# Patient Record
Sex: MALE | Race: WHITE | NOT HISPANIC OR LATINO | Employment: FULL TIME | URBAN - METROPOLITAN AREA
[De-identification: names, ages, dates, MRNs, and addresses within clinical notes are randomized per-mention and may not be internally consistent; named-entity substitution may affect disease eponyms.]

---

## 2023-06-14 ENCOUNTER — HOSPITAL ENCOUNTER (OUTPATIENT)
Dept: DATA CONVERSION | Facility: HOSPITAL | Age: 48
End: 2023-06-14
Attending: INTERNAL MEDICINE | Admitting: INTERNAL MEDICINE
Payer: COMMERCIAL

## 2023-06-14 DIAGNOSIS — Z72.0 TOBACCO USE: ICD-10-CM

## 2023-06-14 DIAGNOSIS — K64.0 FIRST DEGREE HEMORRHOIDS: ICD-10-CM

## 2023-06-14 DIAGNOSIS — Z12.11 ENCOUNTER FOR SCREENING FOR MALIGNANT NEOPLASM OF COLON: ICD-10-CM

## 2023-09-07 VITALS
WEIGHT: 226.41 LBS | HEIGHT: 73 IN | TEMPERATURE: 97.7 F | HEART RATE: 88 BPM | BODY MASS INDEX: 30.01 KG/M2 | SYSTOLIC BLOOD PRESSURE: 144 MMHG | DIASTOLIC BLOOD PRESSURE: 94 MMHG | RESPIRATION RATE: 16 BRPM

## 2023-09-30 NOTE — H&P
History of Present Illness:   History Present Illness:  Reason for surgery: Colon cancer screening   HPI:    48 year-old male who presented for average-risk screening colonoscopy.  Patient denies any GI symptoms.    Allergies:        Allergies:  ·  No Known Allergies :     Home Medication Review:   Home Medications Reviewed: yes     Impression/Procedure:   ·  Impression and Planned Procedure: 48 year-old male who presented for average-risk screening colonoscopy.  Patient denies any GI symptoms.       ERAS (Enhanced Recovery After Surgery):  ·  ERAS Patient: no     Review of Systems:   Review of Systems:  Gastrointestinal: NEGATIVE: Nausea, Vomiting, Diarrhea,  Constipation, Abdominal Pain         Vital Signs:  Temperature C: 36.5 degrees C   Temperature F: 97.7 degrees F   Heart Rate: 88 beats per minute   Respiratory Rate: 16 breath per minute   Blood Pressure Systolic: 144 mm/Hg   Blood Pressure Diastolic: 94 mm/Hg     Physical Exam by System:    Constitutional: Awake/alert/oriented x3, no distress,  alert and cooperative   Eyes: EOMI, clear sclera   ENMT: mucous membranes moist, no apparent injury   Head/Neck: Neck supple, no apparent injury   Respiratory/Thorax: CTAB, normal breath sounds   Cardiovascular: Regular, rate and rhythm   Gastrointestinal: Nondistended, soft, non-tender,  no rebound tenderness or guarding   Musculoskeletal: ROM intact   Extremities: normal extremities, no cyanosis   Neurological: alert and oriented x3, intact senses   Psychological: Appropriate mood and behavior   Skin: Warm and dry, no rash     Consent:   COVID-19 Consent:  ·  COVID-19 Risk Consent Surgeon has reviewed key risks related to the risk of sanjay COVID-19 and if they contract COVID-19 what the risks are.       Electronic Signatures:  Irwin Lo)  (Signed 14-Jun-2023 08:41)   Authored: History of Present Illness, Allergies, Home  Medication Review, Impression/Procedure, ERAS, Review of Systems, Physical Exam,  Consent, Note Completion      Last Updated: 14-Jun-2023 08:41 by Irwin Lo)

## 2023-10-08 ENCOUNTER — LAB REQUISITION (OUTPATIENT)
Dept: LAB | Facility: HOSPITAL | Age: 48
End: 2023-10-08
Payer: COMMERCIAL

## 2023-10-08 DIAGNOSIS — U07.1 COVID-19: ICD-10-CM

## 2023-10-08 LAB — SARS-COV-2 RNA RESP QL NAA+PROBE: DETECTED

## 2023-10-08 PROCEDURE — 87635 SARS-COV-2 COVID-19 AMP PRB: CPT

## 2024-01-02 ENCOUNTER — OFFICE VISIT (OUTPATIENT)
Dept: PRIMARY CARE | Facility: CLINIC | Age: 49
End: 2024-01-02
Payer: COMMERCIAL

## 2024-01-02 ENCOUNTER — LAB (OUTPATIENT)
Dept: LAB | Facility: LAB | Age: 49
End: 2024-01-02
Payer: COMMERCIAL

## 2024-01-02 VITALS
WEIGHT: 237 LBS | HEART RATE: 73 BPM | HEIGHT: 73 IN | OXYGEN SATURATION: 97 % | SYSTOLIC BLOOD PRESSURE: 133 MMHG | BODY MASS INDEX: 31.41 KG/M2 | TEMPERATURE: 97.6 F | DIASTOLIC BLOOD PRESSURE: 93 MMHG

## 2024-01-02 DIAGNOSIS — K80.50 BILIARY COLIC: Primary | ICD-10-CM

## 2024-01-02 DIAGNOSIS — K80.50 BILIARY COLIC: ICD-10-CM

## 2024-01-02 LAB
ALBUMIN SERPL BCP-MCNC: 4.2 G/DL (ref 3.4–5)
ALP SERPL-CCNC: 63 U/L (ref 33–120)
ALT SERPL W P-5'-P-CCNC: 26 U/L (ref 10–52)
ANION GAP SERPL CALC-SCNC: 13 MMOL/L (ref 10–20)
APPEARANCE UR: CLEAR
AST SERPL W P-5'-P-CCNC: 18 U/L (ref 9–39)
BASOPHILS # BLD AUTO: 0.05 X10*3/UL (ref 0–0.1)
BASOPHILS NFR BLD AUTO: 0.6 %
BILIRUB SERPL-MCNC: 0.8 MG/DL (ref 0–1.2)
BILIRUB UR STRIP.AUTO-MCNC: NEGATIVE MG/DL
BUN SERPL-MCNC: 14 MG/DL (ref 6–23)
CALCIUM SERPL-MCNC: 9.1 MG/DL (ref 8.6–10.6)
CHLORIDE SERPL-SCNC: 102 MMOL/L (ref 98–107)
CO2 SERPL-SCNC: 29 MMOL/L (ref 21–32)
COLOR UR: YELLOW
CREAT SERPL-MCNC: 0.9 MG/DL (ref 0.5–1.3)
EOSINOPHIL # BLD AUTO: 0.35 X10*3/UL (ref 0–0.7)
EOSINOPHIL NFR BLD AUTO: 4.2 %
ERYTHROCYTE [DISTWIDTH] IN BLOOD BY AUTOMATED COUNT: 12.9 % (ref 11.5–14.5)
GFR SERPL CREATININE-BSD FRML MDRD: >90 ML/MIN/1.73M*2
GLUCOSE SERPL-MCNC: 100 MG/DL (ref 74–99)
GLUCOSE UR STRIP.AUTO-MCNC: NEGATIVE MG/DL
HCT VFR BLD AUTO: 50.6 % (ref 41–52)
HGB BLD-MCNC: 16.8 G/DL (ref 13.5–17.5)
IMM GRANULOCYTES # BLD AUTO: 0.01 X10*3/UL (ref 0–0.7)
IMM GRANULOCYTES NFR BLD AUTO: 0.1 % (ref 0–0.9)
KETONES UR STRIP.AUTO-MCNC: NEGATIVE MG/DL
LEUKOCYTE ESTERASE UR QL STRIP.AUTO: NEGATIVE
LIPASE SERPL-CCNC: 15 U/L (ref 9–82)
LYMPHOCYTES # BLD AUTO: 2.69 X10*3/UL (ref 1.2–4.8)
LYMPHOCYTES NFR BLD AUTO: 32.4 %
MCH RBC QN AUTO: 30.3 PG (ref 26–34)
MCHC RBC AUTO-ENTMCNC: 33.2 G/DL (ref 32–36)
MCV RBC AUTO: 91 FL (ref 80–100)
MONOCYTES # BLD AUTO: 1.14 X10*3/UL (ref 0.1–1)
MONOCYTES NFR BLD AUTO: 13.7 %
NEUTROPHILS # BLD AUTO: 4.07 X10*3/UL (ref 1.2–7.7)
NEUTROPHILS NFR BLD AUTO: 49 %
NITRITE UR QL STRIP.AUTO: NEGATIVE
NRBC BLD-RTO: 0 /100 WBCS (ref 0–0)
PH UR STRIP.AUTO: 7 [PH]
PLATELET # BLD AUTO: 196 X10*3/UL (ref 150–450)
POTASSIUM SERPL-SCNC: 4.1 MMOL/L (ref 3.5–5.3)
PROT SERPL-MCNC: 6.5 G/DL (ref 6.4–8.2)
PROT UR STRIP.AUTO-MCNC: NEGATIVE MG/DL
RBC # BLD AUTO: 5.55 X10*6/UL (ref 4.5–5.9)
RBC # UR STRIP.AUTO: NEGATIVE /UL
SODIUM SERPL-SCNC: 140 MMOL/L (ref 136–145)
SP GR UR STRIP.AUTO: 1.02
UROBILINOGEN UR STRIP.AUTO-MCNC: 2 MG/DL
WBC # BLD AUTO: 8.3 X10*3/UL (ref 4.4–11.3)

## 2024-01-02 PROCEDURE — 81003 URINALYSIS AUTO W/O SCOPE: CPT

## 2024-01-02 PROCEDURE — 83690 ASSAY OF LIPASE: CPT

## 2024-01-02 PROCEDURE — 85025 COMPLETE CBC W/AUTO DIFF WBC: CPT

## 2024-01-02 PROCEDURE — 36415 COLL VENOUS BLD VENIPUNCTURE: CPT

## 2024-01-02 PROCEDURE — 99214 OFFICE O/P EST MOD 30 MIN: CPT | Performed by: FAMILY MEDICINE

## 2024-01-02 PROCEDURE — 80053 COMPREHEN METABOLIC PANEL: CPT

## 2024-01-02 ASSESSMENT — LIFESTYLE VARIABLES
HOW MANY STANDARD DRINKS CONTAINING ALCOHOL DO YOU HAVE ON A TYPICAL DAY: 1 OR 2
AUDIT-C TOTAL SCORE: 3
HOW OFTEN DO YOU HAVE SIX OR MORE DRINKS ON ONE OCCASION: NEVER
SKIP TO QUESTIONS 9-10: 1
HOW OFTEN DO YOU HAVE A DRINK CONTAINING ALCOHOL: 2-3 TIMES A WEEK

## 2024-01-02 ASSESSMENT — COLUMBIA-SUICIDE SEVERITY RATING SCALE - C-SSRS
2. HAVE YOU ACTUALLY HAD ANY THOUGHTS OF KILLING YOURSELF?: NO
6. HAVE YOU EVER DONE ANYTHING, STARTED TO DO ANYTHING, OR PREPARED TO DO ANYTHING TO END YOUR LIFE?: NO
1. IN THE PAST MONTH, HAVE YOU WISHED YOU WERE DEAD OR WISHED YOU COULD GO TO SLEEP AND NOT WAKE UP?: NO

## 2024-01-02 ASSESSMENT — PATIENT HEALTH QUESTIONNAIRE - PHQ9
1. LITTLE INTEREST OR PLEASURE IN DOING THINGS: NOT AT ALL
SUM OF ALL RESPONSES TO PHQ9 QUESTIONS 1 & 2: 0
2. FEELING DOWN, DEPRESSED OR HOPELESS: NOT AT ALL

## 2024-01-02 NOTE — PROGRESS NOTES
"Complains of  RUQ  abdominal pain nonradiating for  60days worse after fatty meals lasting 20min  not improved with prilosec    Denies fever sore throat  nausea vomiting diarrhea melena hematochezia hematemesis dysuria hematuria polyuria flank pain hesitancy dribbling passing of urine or sediment nocturia cough chest congestion shortness of breath wheeze  sputum weight loss lightheadedness dizziness presyncope syncope    PMH: none    PSH: none    last colonoscopy:  2023      FH :  neg   for colon ca          neg   for inflammatory bowel disease          neg    for prostate ca                          etoh use :  < 1 per day         caffeine use: 2 per day          nsaid use : none     BP (!) 133/93   Pulse 73   Temp 36.4 °C (97.6 °F)   Ht 1.854 m (6' 1\")   Wt 108 kg (237 lb)   SpO2 97%   BMI 31.27 kg/m²       Not ill-appearing  Skin without pallor petechia icterus or ecchymosis  Membranes moist oropharynx normal  Neck without adenopathy  Chest clear to auscultation without rales rhonchi or wheeze  Heart regular rate and rhythm without murmur  Abdomen not rigid, nondistended nontender without  organomegaly  withno mass or bruits  no CVA tenderness   testes descended nontender no mass no hernias  Extremities warm peripheral pulses palpable    "

## 2024-01-02 NOTE — RESULT ENCOUNTER NOTE
Labs are normal showing however possibility of gallstones still exist.  Obtain ultrasound as discussed

## 2024-01-04 ENCOUNTER — APPOINTMENT (OUTPATIENT)
Dept: RADIOLOGY | Facility: CLINIC | Age: 49
End: 2024-01-04
Payer: COMMERCIAL

## 2024-01-04 ENCOUNTER — TELEPHONE (OUTPATIENT)
Dept: PRIMARY CARE | Facility: CLINIC | Age: 49
End: 2024-01-04
Payer: COMMERCIAL

## 2024-01-04 ENCOUNTER — ANCILLARY PROCEDURE (OUTPATIENT)
Dept: RADIOLOGY | Facility: CLINIC | Age: 49
End: 2024-01-04
Payer: COMMERCIAL

## 2024-01-04 DIAGNOSIS — K80.50 BILIARY COLIC: ICD-10-CM

## 2024-01-04 PROCEDURE — 76705 ECHO EXAM OF ABDOMEN: CPT

## 2024-01-04 PROCEDURE — 76705 ECHO EXAM OF ABDOMEN: CPT | Performed by: STUDENT IN AN ORGANIZED HEALTH CARE EDUCATION/TRAINING PROGRAM

## 2024-01-04 NOTE — TELEPHONE ENCOUNTER
Patient notified     ----- Message from Chris Neil MD sent at 1/2/2024  4:31 PM EST -----  Labs are normal showing however possibility of gallstones still exist.  Obtain ultrasound as discussed

## 2024-01-05 DIAGNOSIS — K82.4 GALLBLADDER POLYP: Primary | ICD-10-CM

## 2024-01-05 DIAGNOSIS — K80.50 BILIARY COLIC: ICD-10-CM

## 2024-01-09 PROBLEM — K82.4 GALLBLADDER POLYP: Status: ACTIVE | Noted: 2024-01-09

## 2024-01-09 NOTE — PROGRESS NOTES
History Of Present Illness  HPI   The patient is a 48-year-old male who complains of a 1 year history of mild intermittent right upper quadrant abdominal pain.  The pain radiates into the right lower quadrant and into his right chest and back.  The symptoms had improved, but over the past couple months the discomfort occurs daily.  Symptoms worsen after a fatty meal and typically will last about 30 minutes.  No nausea or vomiting.  No jaundice.  He has had purposeful weight loss of 5 pounds over the past 3 months.    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    Past Medical History  He has no past medical history on file.    Surgical History  He has a past surgical history that includes Other surgical history (01/18/2022) and Colonoscopy (2023).     Allergies  Patient has no known allergies.    Social History  He reports that he has been smoking cigars. He has never used smokeless tobacco. No history on file for alcohol use and drug use.    Family History  No family history on file.    Review of Systems  Review of Systems:  Constitutional:  no fever, no chills, no significant weight change  Neurological: No history of CVA or seizure disorder  Eyes: No pain, no recent visual change  ENT:  No recent hearing loss  Neck: No pain  Cardiovascular: No chest pain, no history of cardiac disease such as myocardial infarction or arrhythmia or congestive heart failure  Pulmonary: No shortness of breath, no history of pulmonary disease such as pneumonia or COPD  Breast: No history of breast disease or breast mass  Gastrointestinal:   As above.  No nausea or vomiting, no constipation or diarrhea or blood in the stool.  No history of ulcers.  No liver or pancreas disease.  No intestinal disorder.  Genitourinary: No hematuria or dysuria, no kidney disease  Musculoskeletal:  no arthralgia, no muscle or bone pain  Integumentary:  no rash  Psychiatric:   "No anxiety or depression  Endocrine:  no history of diabetes  Hematologic/Lymphatic: No easy bruising or bleeding          Last Recorded Vitals  Blood pressure (!) 127/91, pulse 98, temperature 36.4 °C (97.5 °F), temperature source Temporal, resp. rate 16, height 1.854 m (6' 1\"), weight 107 kg (235 lb 3.2 oz), SpO2 98 %.    Physical Exam  Constitutional: Well-developed, well-nourished, alert and oriented, no acute distress  Skin: Warm and dry, no lesions, no rashes, no jaundice  HEENT: Normocephalic, atraumatic, EOMI, no scleral icterus, eyes have no redness or swelling or discharge, external inspection of ears and nose is normal, mucous membranes moist  Neck: Soft, nontender, no mass or adenopathy  Cardiac: Regular rate and rhythm, no murmur  Chest: Patent airway, clear to auscultation, normal breath sounds with good chest expansion, no wheezes or rales or rhonchi noted, thorax symmetric  Abdomen: Nondistended, positive bowel sounds, soft, nontender, no mass  Rectal: Not performed  Extremities: No injury, no lower extremity edema or calf tenderness  Lymphatic: No cervical adenopathy  Musculoskeletal: Range of motion intact, no joint swelling, normal strength  Neurological: Alert and oriented x3, intact sensory and motor function, no obvious focal neurologic abnormalities, normal gait  Psychological: Appropriate mood and behavior    Relevant Results  Labs from January 2, 2024:  WBC 8.3, hemoglobin 16.8, platelet 196.  CMP unremarkable.  Lipase normal.     I reviewed the ultrasound report and images from January 4, 2024  IMPRESSION:  Mildly coarsened heterogenous hepatic echotexture against a  background of steatosis. Findings likely secondary to chronic liver  disease. No focal lesion.  8 mm echogenic gallbladder lesion, likely a polyp. Recommend 6-12  month ultrasound follow-up.    Assessment/Plan   Diagnoses and all orders for this visit:  Gallbladder polyp  -     CT abdomen pelvis w IV contrast; Future  Right " upper quadrant abdominal pain  -     CT abdomen pelvis w IV contrast; Future      48-year-old male with right upper quadrant abdominal pain, found to have an 8 mm gallbladder lesion, likely a polyp, on ultrasound.  A polyp would not explain his symptoms.  Recommend CT scan of the abdomen and pelvis for further evaluation.  Follow-up after the CT scan is completed.  Avoid fatty foods.  Recommend repeat gallbladder ultrasound in 6 months with follow-up.           Doug Arellano MD

## 2024-01-10 ENCOUNTER — OFFICE VISIT (OUTPATIENT)
Dept: SURGERY | Facility: CLINIC | Age: 49
End: 2024-01-10
Payer: COMMERCIAL

## 2024-01-10 VITALS
OXYGEN SATURATION: 98 % | BODY MASS INDEX: 31.17 KG/M2 | DIASTOLIC BLOOD PRESSURE: 91 MMHG | HEIGHT: 73 IN | WEIGHT: 235.2 LBS | RESPIRATION RATE: 16 BRPM | HEART RATE: 98 BPM | SYSTOLIC BLOOD PRESSURE: 127 MMHG | TEMPERATURE: 97.5 F

## 2024-01-10 DIAGNOSIS — K82.4 GALLBLADDER POLYP: Primary | ICD-10-CM

## 2024-01-10 DIAGNOSIS — R10.11 RIGHT UPPER QUADRANT ABDOMINAL PAIN: ICD-10-CM

## 2024-01-10 PROCEDURE — 99204 OFFICE O/P NEW MOD 45 MIN: CPT | Performed by: SURGERY

## 2024-01-10 NOTE — LETTER
January 10, 2024     Chris Neil MD  [address]  [address]  [facility] [address]    Patient: Alfredo Stevens   YOB: 1975   Date of Visit: 1/10/2024       Dear Dr. Chris Neil MD:    Thank you for referring Alfredo Stevens to me for evaluation. Below are my notes for this consultation.  If you have questions, please do not hesitate to call me. I look forward to following your patient along with you.       Sincerely,     Doug Arellano MD      CC: No Recipients  ______________________________________________________________________________________    History Of Present Illness  HPI   The patient is a 48-year-old male who complains of a 1 year history of mild intermittent right upper quadrant abdominal pain.  The pain radiates into the right lower quadrant and into his right chest and back.  The symptoms had improved, but over the past couple months the discomfort occurs daily.  Symptoms worsen after a fatty meal and typically will last about 30 minutes.  No nausea or vomiting.  No jaundice.  He has had purposeful weight loss of 5 pounds over the past 3 months.    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    Past Medical History  He has no past medical history on file.    Surgical History  He has a past surgical history that includes Other surgical history (01/18/2022) and Colonoscopy (2023).     Allergies  Patient has no known allergies.    Social History  He reports that he has been smoking cigars. He has never used smokeless tobacco. No history on file for alcohol use and drug use.    Family History  No family history on file.    Review of Systems  Review of Systems:  Constitutional:  no fever, no chills, no significant weight change  Neurological: No history of CVA or seizure disorder  Eyes: No pain, no recent visual change  ENT:  No recent hearing loss  Neck: No pain  Cardiovascular: No chest pain,  "no history of cardiac disease such as myocardial infarction or arrhythmia or congestive heart failure  Pulmonary: No shortness of breath, no history of pulmonary disease such as pneumonia or COPD  Breast: No history of breast disease or breast mass  Gastrointestinal:   As above.  No nausea or vomiting, no constipation or diarrhea or blood in the stool.  No history of ulcers.  No liver or pancreas disease.  No intestinal disorder.  Genitourinary: No hematuria or dysuria, no kidney disease  Musculoskeletal:  no arthralgia, no muscle or bone pain  Integumentary:  no rash  Psychiatric:  No anxiety or depression  Endocrine:  no history of diabetes  Hematologic/Lymphatic: No easy bruising or bleeding          Last Recorded Vitals  Blood pressure (!) 127/91, pulse 98, temperature 36.4 °C (97.5 °F), temperature source Temporal, resp. rate 16, height 1.854 m (6' 1\"), weight 107 kg (235 lb 3.2 oz), SpO2 98 %.    Physical Exam  Constitutional: Well-developed, well-nourished, alert and oriented, no acute distress  Skin: Warm and dry, no lesions, no rashes, no jaundice  HEENT: Normocephalic, atraumatic, EOMI, no scleral icterus, eyes have no redness or swelling or discharge, external inspection of ears and nose is normal, mucous membranes moist  Neck: Soft, nontender, no mass or adenopathy  Cardiac: Regular rate and rhythm, no murmur  Chest: Patent airway, clear to auscultation, normal breath sounds with good chest expansion, no wheezes or rales or rhonchi noted, thorax symmetric  Abdomen: Nondistended, positive bowel sounds, soft, nontender, no mass  Rectal: Not performed  Extremities: No injury, no lower extremity edema or calf tenderness  Lymphatic: No cervical adenopathy  Musculoskeletal: Range of motion intact, no joint swelling, normal strength  Neurological: Alert and oriented x3, intact sensory and motor function, no obvious focal neurologic abnormalities, normal gait  Psychological: Appropriate mood and " behavior    Relevant Results  Labs from January 2, 2024:  WBC 8.3, hemoglobin 16.8, platelet 196.  CMP unremarkable.  Lipase normal.     I reviewed the ultrasound report and images from January 4, 2024  IMPRESSION:  Mildly coarsened heterogenous hepatic echotexture against a  background of steatosis. Findings likely secondary to chronic liver  disease. No focal lesion.  8 mm echogenic gallbladder lesion, likely a polyp. Recommend 6-12  month ultrasound follow-up.    Assessment/Plan  Diagnoses and all orders for this visit:  Gallbladder polyp  -     CT abdomen pelvis w IV contrast; Future  Right upper quadrant abdominal pain  -     CT abdomen pelvis w IV contrast; Future      48-year-old male with right upper quadrant abdominal pain, found to have an 8 mm gallbladder lesion, likely a polyp, on ultrasound.  A polyp would not explain his symptoms.  Recommend CT scan of the abdomen and pelvis for further evaluation.  Follow-up after the CT scan is completed.  Avoid fatty foods.  Recommend repeat gallbladder ultrasound in 6 months with follow-up.           Doug Arellano MD

## 2024-01-30 ENCOUNTER — HOSPITAL ENCOUNTER (OUTPATIENT)
Dept: RADIOLOGY | Facility: CLINIC | Age: 49
Discharge: HOME | End: 2024-01-30
Payer: COMMERCIAL

## 2024-01-30 DIAGNOSIS — R10.11 RIGHT UPPER QUADRANT ABDOMINAL PAIN: ICD-10-CM

## 2024-01-30 DIAGNOSIS — K82.4 GALLBLADDER POLYP: ICD-10-CM

## 2024-01-30 PROCEDURE — 74177 CT ABD & PELVIS W/CONTRAST: CPT | Performed by: RADIOLOGY

## 2024-01-30 PROCEDURE — 2550000001 HC RX 255 CONTRASTS: Performed by: SURGERY

## 2024-01-30 PROCEDURE — 74177 CT ABD & PELVIS W/CONTRAST: CPT

## 2024-01-30 RX ADMIN — IOHEXOL 75 ML: 350 INJECTION, SOLUTION INTRAVENOUS at 11:39

## 2024-04-01 ENCOUNTER — OFFICE VISIT (OUTPATIENT)
Dept: PRIMARY CARE | Facility: CLINIC | Age: 49
End: 2024-04-01
Payer: COMMERCIAL

## 2024-04-01 ENCOUNTER — LAB (OUTPATIENT)
Dept: LAB | Facility: LAB | Age: 49
End: 2024-04-01
Payer: COMMERCIAL

## 2024-04-01 VITALS
SYSTOLIC BLOOD PRESSURE: 128 MMHG | HEART RATE: 82 BPM | BODY MASS INDEX: 31.28 KG/M2 | HEIGHT: 73 IN | TEMPERATURE: 97.8 F | WEIGHT: 236 LBS | DIASTOLIC BLOOD PRESSURE: 88 MMHG | OXYGEN SATURATION: 97 %

## 2024-04-01 DIAGNOSIS — K82.4 GALLBLADDER POLYP: ICD-10-CM

## 2024-04-01 DIAGNOSIS — R00.2 PALPITATIONS: ICD-10-CM

## 2024-04-01 DIAGNOSIS — R00.2 PALPITATIONS: Primary | ICD-10-CM

## 2024-04-01 DIAGNOSIS — I49.9 IRREGULAR HEART BEAT: ICD-10-CM

## 2024-04-01 DIAGNOSIS — Z86.19 H/O COLD SORES: ICD-10-CM

## 2024-04-01 LAB
ANION GAP SERPL CALC-SCNC: 13 MMOL/L (ref 10–20)
BASOPHILS # BLD AUTO: 0.04 X10*3/UL (ref 0–0.1)
BASOPHILS NFR BLD AUTO: 0.5 %
BUN SERPL-MCNC: 16 MG/DL (ref 6–23)
CALCIUM SERPL-MCNC: 8.9 MG/DL (ref 8.6–10.6)
CHLORIDE SERPL-SCNC: 102 MMOL/L (ref 98–107)
CO2 SERPL-SCNC: 27 MMOL/L (ref 21–32)
CREAT SERPL-MCNC: 0.8 MG/DL (ref 0.5–1.3)
EGFRCR SERPLBLD CKD-EPI 2021: >90 ML/MIN/1.73M*2
EOSINOPHIL # BLD AUTO: 0.27 X10*3/UL (ref 0–0.7)
EOSINOPHIL NFR BLD AUTO: 3.5 %
ERYTHROCYTE [DISTWIDTH] IN BLOOD BY AUTOMATED COUNT: 13.1 % (ref 11.5–14.5)
GLUCOSE SERPL-MCNC: 88 MG/DL (ref 74–99)
HCT VFR BLD AUTO: 47.3 % (ref 41–52)
HGB BLD-MCNC: 15.9 G/DL (ref 13.5–17.5)
IMM GRANULOCYTES # BLD AUTO: 0.02 X10*3/UL (ref 0–0.7)
IMM GRANULOCYTES NFR BLD AUTO: 0.3 % (ref 0–0.9)
LYMPHOCYTES # BLD AUTO: 2.34 X10*3/UL (ref 1.2–4.8)
LYMPHOCYTES NFR BLD AUTO: 30.4 %
MCH RBC QN AUTO: 29.9 PG (ref 26–34)
MCHC RBC AUTO-ENTMCNC: 33.6 G/DL (ref 32–36)
MCV RBC AUTO: 89 FL (ref 80–100)
MONOCYTES # BLD AUTO: 1.01 X10*3/UL (ref 0.1–1)
MONOCYTES NFR BLD AUTO: 13.1 %
NEUTROPHILS # BLD AUTO: 4.03 X10*3/UL (ref 1.2–7.7)
NEUTROPHILS NFR BLD AUTO: 52.2 %
NRBC BLD-RTO: 0 /100 WBCS (ref 0–0)
PLATELET # BLD AUTO: 206 X10*3/UL (ref 150–450)
POTASSIUM SERPL-SCNC: 4.9 MMOL/L (ref 3.5–5.3)
RBC # BLD AUTO: 5.32 X10*6/UL (ref 4.5–5.9)
SODIUM SERPL-SCNC: 137 MMOL/L (ref 136–145)
TSH SERPL-ACNC: 0.75 MIU/L (ref 0.44–3.98)
WBC # BLD AUTO: 7.7 X10*3/UL (ref 4.4–11.3)

## 2024-04-01 PROCEDURE — 80048 BASIC METABOLIC PNL TOTAL CA: CPT

## 2024-04-01 PROCEDURE — 93000 ELECTROCARDIOGRAM COMPLETE: CPT | Performed by: FAMILY MEDICINE

## 2024-04-01 PROCEDURE — 85025 COMPLETE CBC W/AUTO DIFF WBC: CPT

## 2024-04-01 PROCEDURE — 84443 ASSAY THYROID STIM HORMONE: CPT

## 2024-04-01 PROCEDURE — 36415 COLL VENOUS BLD VENIPUNCTURE: CPT

## 2024-04-01 PROCEDURE — 99214 OFFICE O/P EST MOD 30 MIN: CPT | Performed by: FAMILY MEDICINE

## 2024-04-01 RX ORDER — VALACYCLOVIR HYDROCHLORIDE 1 G/1
TABLET, FILM COATED ORAL
Qty: 16 TABLET | Refills: 5 | Status: SHIPPED | OUTPATIENT
Start: 2024-04-01

## 2024-04-01 ASSESSMENT — PATIENT HEALTH QUESTIONNAIRE - PHQ9
SUM OF ALL RESPONSES TO PHQ9 QUESTIONS 1 & 2: 0
2. FEELING DOWN, DEPRESSED OR HOPELESS: NOT AT ALL
1. LITTLE INTEREST OR PLEASURE IN DOING THINGS: NOT AT ALL

## 2024-04-01 NOTE — PROGRESS NOTES
"5 hour episode of irregular heat beat 3/22/24 that spontaneously resolved       denies chest pain SOB SMITH diaphoresis nausea syncope presyncope orthopnea edema leg pain dysarthria aphasia focal weakness headache numbness tingling  visual disturbance gait disturbance polydipsia polyuria weight loss tremor epistaxis melena rectal bleeding tremor fatigue weight change temperature intolerance.  Occasional cigar smoker  Denies ephedra use energy drink use excess alcohol.  Drinks caffeine daily    /88   Pulse 82   Temp 36.6 °C (97.8 °F)   Ht 1.854 m (6' 1\")   Wt 107 kg (236 lb)   SpO2 97%   BMI 31.14 kg/m²       Appears comfortable  No retractions  Skin without pallor petechia icterus cyanosis  Neck without JVD thyromegaly bruits  Chest clear to auscultation without rales rhonchi wheeze  Heart regular rate and rhythm without murmur  Abdomen soft nondistended nontender without organomegaly or mass  Extremities without erythema edema Homans or cord  Peripheral pulses palpable  Neuro intact      "

## 2024-05-24 PROBLEM — Z86.19 HISTORY OF VIRAL INFECTION: Status: ACTIVE | Noted: 2024-05-24

## 2024-05-24 PROBLEM — R00.2 PALPITATIONS: Status: ACTIVE | Noted: 2024-05-24

## 2024-05-24 PROBLEM — I49.9 IRREGULAR HEART RHYTHM: Status: ACTIVE | Noted: 2024-05-24

## 2024-05-28 ENCOUNTER — OFFICE VISIT (OUTPATIENT)
Dept: PRIMARY CARE | Facility: CLINIC | Age: 49
End: 2024-05-28
Payer: COMMERCIAL

## 2024-05-28 ENCOUNTER — LAB (OUTPATIENT)
Dept: LAB | Facility: LAB | Age: 49
End: 2024-05-28
Payer: COMMERCIAL

## 2024-05-28 VITALS
BODY MASS INDEX: 29.82 KG/M2 | TEMPERATURE: 98.2 F | HEIGHT: 73 IN | WEIGHT: 225 LBS | HEART RATE: 83 BPM | OXYGEN SATURATION: 96 % | DIASTOLIC BLOOD PRESSURE: 84 MMHG | SYSTOLIC BLOOD PRESSURE: 131 MMHG

## 2024-05-28 DIAGNOSIS — N50.812 PAIN IN LEFT TESTICLE: Primary | ICD-10-CM

## 2024-05-28 DIAGNOSIS — N50.812 PAIN IN LEFT TESTICLE: ICD-10-CM

## 2024-05-28 LAB
APPEARANCE UR: CLEAR
BILIRUB UR STRIP.AUTO-MCNC: NEGATIVE MG/DL
COLOR UR: YELLOW
GLUCOSE UR STRIP.AUTO-MCNC: NORMAL MG/DL
HOLD SPECIMEN: NORMAL
KETONES UR STRIP.AUTO-MCNC: ABNORMAL MG/DL
LEUKOCYTE ESTERASE UR QL STRIP.AUTO: NEGATIVE
NITRITE UR QL STRIP.AUTO: NEGATIVE
PH UR STRIP.AUTO: 7 [PH]
PROT UR STRIP.AUTO-MCNC: NEGATIVE MG/DL
RBC # UR STRIP.AUTO: NEGATIVE /UL
SP GR UR STRIP.AUTO: 1.02
UROBILINOGEN UR STRIP.AUTO-MCNC: NORMAL MG/DL

## 2024-05-28 PROCEDURE — 81003 URINALYSIS AUTO W/O SCOPE: CPT

## 2024-05-28 PROCEDURE — 99214 OFFICE O/P EST MOD 30 MIN: CPT | Performed by: FAMILY MEDICINE

## 2024-05-28 ASSESSMENT — COLUMBIA-SUICIDE SEVERITY RATING SCALE - C-SSRS
1. IN THE PAST MONTH, HAVE YOU WISHED YOU WERE DEAD OR WISHED YOU COULD GO TO SLEEP AND NOT WAKE UP?: NO
2. HAVE YOU ACTUALLY HAD ANY THOUGHTS OF KILLING YOURSELF?: NO
6. HAVE YOU EVER DONE ANYTHING, STARTED TO DO ANYTHING, OR PREPARED TO DO ANYTHING TO END YOUR LIFE?: NO

## 2024-05-28 NOTE — PROGRESS NOTES
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  He denies dysuria hematuria pyuria penile discharge abdominal pain flank pain hip or back pain numbness and tingling or weakness of his lower extremities.  Also denies fevers chills sweats weight loss          /84   Pulse 83   Temp 36.8 °C (98.2 °F)   Ht 1.854 m (6' 1\")   Wt 102 kg (225 lb)   SpO2 96%   BMI 29.69 kg/m²       Skin without pallor or cyanosis  Abdomen soft nondistended nontender no CVA or suprapubic tenderness  Testes descended.  No testicular masses  Epididymis normal nontender bilaterally  Bilateral spermatic cord palpable and nontender  No hernias detected  Urethra normal  No penile discharge or penile lesions or ulcerations  No inguinal adenopathy    Likely testicular contusion  No evidence of mass or torsion  Will obtain ultrasound to confirm  "

## 2024-05-31 ENCOUNTER — TELEPHONE (OUTPATIENT)
Dept: PRIMARY CARE | Facility: CLINIC | Age: 49
End: 2024-05-31
Payer: COMMERCIAL

## 2024-05-31 NOTE — TELEPHONE ENCOUNTER
Unable to reach patient.  ----- Message from Chris Neil MD sent at 5/29/2024 12:29 PM EDT -----  Ketones seen in the urine which could be seen with dehydration.  Otherwise no urinary abnormalities.  Recommend he drinks plenty of fluids

## 2024-06-03 NOTE — TELEPHONE ENCOUNTER
Spoke  to patient and he understood.  ----- Message from Chris Neil MD sent at 5/29/2024 12:29 PM EDT -----  Ketones seen in the urine which could be seen with dehydration.  Otherwise no urinary abnormalities.  Recommend he drinks plenty of fluids

## 2024-06-04 ENCOUNTER — HOSPITAL ENCOUNTER (OUTPATIENT)
Dept: RADIOLOGY | Facility: CLINIC | Age: 49
Discharge: HOME | End: 2024-06-04
Payer: COMMERCIAL

## 2024-06-04 DIAGNOSIS — N50.812 PAIN IN LEFT TESTICLE: ICD-10-CM

## 2024-06-04 PROCEDURE — 93975 VASCULAR STUDY: CPT

## 2024-06-04 PROCEDURE — 76870 US EXAM SCROTUM: CPT | Performed by: RADIOLOGY

## 2024-06-04 PROCEDURE — 93975 VASCULAR STUDY: CPT | Performed by: RADIOLOGY

## 2024-07-01 ENCOUNTER — HOSPITAL ENCOUNTER (OUTPATIENT)
Dept: RADIOLOGY | Facility: CLINIC | Age: 49
Discharge: HOME | End: 2024-07-01
Payer: COMMERCIAL

## 2024-07-01 DIAGNOSIS — K82.4 GALLBLADDER POLYP: ICD-10-CM

## 2024-07-01 PROCEDURE — 76705 ECHO EXAM OF ABDOMEN: CPT

## 2024-07-01 PROCEDURE — 76705 ECHO EXAM OF ABDOMEN: CPT | Performed by: STUDENT IN AN ORGANIZED HEALTH CARE EDUCATION/TRAINING PROGRAM

## 2024-07-03 NOTE — RESULT ENCOUNTER NOTE
Ultrasound shows stable gallbladder polyp.  Also there is slight enlargement of his liver with fatty deposition likely due to poor diet.  He needs to lose weight exercise and minimize alcohol.

## 2024-07-09 ENCOUNTER — TELEPHONE (OUTPATIENT)
Dept: PRIMARY CARE | Facility: CLINIC | Age: 49
End: 2024-07-09
Payer: COMMERCIAL

## 2024-07-09 NOTE — TELEPHONE ENCOUNTER
Spoke to patient he understood the results.    ----- Message from Chris Neil MD sent at 7/3/2024 10:38 AM EDT -----  Ultrasound shows stable gallbladder polyp.  Also there is slight enlargement of his liver with fatty deposition likely due to poor diet.  He needs to lose weight exercise and minimize alcohol.

## 2024-09-23 ENCOUNTER — HOSPITAL ENCOUNTER (OUTPATIENT)
Dept: CARDIOLOGY | Facility: CLINIC | Age: 49
Discharge: HOME | End: 2024-09-23
Payer: COMMERCIAL

## 2024-09-23 DIAGNOSIS — I49.9 IRREGULAR HEART BEAT: ICD-10-CM

## 2024-09-23 DIAGNOSIS — R00.2 PALPITATIONS: ICD-10-CM

## 2024-09-23 PROCEDURE — 93247 EXT ECG>7D<15D SCAN A/R: CPT

## 2024-10-24 ENCOUNTER — TELEPHONE (OUTPATIENT)
Dept: PRIMARY CARE | Facility: CLINIC | Age: 49
End: 2024-10-24
Payer: COMMERCIAL

## 2024-10-24 NOTE — TELEPHONE ENCOUNTER
Lm to return my call.     ----- Message from Chris Neil sent at 10/23/2024 10:56 AM EDT -----  Notify patient that Holter  results are unremarkable

## 2024-10-30 ENCOUNTER — OFFICE VISIT (OUTPATIENT)
Dept: URGENT CARE | Age: 49
End: 2024-10-30
Payer: COMMERCIAL

## 2024-10-30 VITALS
TEMPERATURE: 98 F | HEART RATE: 89 BPM | SYSTOLIC BLOOD PRESSURE: 132 MMHG | DIASTOLIC BLOOD PRESSURE: 99 MMHG | OXYGEN SATURATION: 99 % | RESPIRATION RATE: 16 BRPM

## 2024-10-30 DIAGNOSIS — Z20.822 EXPOSURE TO COVID-19 VIRUS: ICD-10-CM

## 2024-10-30 DIAGNOSIS — J06.9 VIRAL UPPER RESPIRATORY TRACT INFECTION: Primary | ICD-10-CM

## 2024-10-30 LAB
POC RAPID INFLUENZA A: NEGATIVE
POC RAPID INFLUENZA B: NEGATIVE
POC SARS-COV-2 AG BINAX: NORMAL

## 2024-10-30 PROCEDURE — 99203 OFFICE O/P NEW LOW 30 MIN: CPT | Performed by: NURSE PRACTITIONER

## 2024-10-30 PROCEDURE — 87804 INFLUENZA ASSAY W/OPTIC: CPT | Performed by: NURSE PRACTITIONER

## 2024-10-30 PROCEDURE — 87811 SARS-COV-2 COVID19 W/OPTIC: CPT | Performed by: NURSE PRACTITIONER

## 2024-10-30 ASSESSMENT — ENCOUNTER SYMPTOMS
COUGH: 1
FATIGUE: 1
CHILLS: 1
FEVER: 0
SORE THROAT: 1

## 2025-03-20 PROCEDURE — 88304 TISSUE EXAM BY PATHOLOGIST: CPT | Performed by: DERMATOLOGY

## 2025-03-24 ENCOUNTER — LAB REQUISITION (OUTPATIENT)
Dept: DERMATOPATHOLOGY | Facility: CLINIC | Age: 50
End: 2025-03-24
Payer: COMMERCIAL

## 2025-03-24 DIAGNOSIS — L98.9 DISORDER OF THE SKIN AND SUBCUTANEOUS TISSUE, UNSPECIFIED: ICD-10-CM

## 2025-03-25 LAB
LABORATORY COMMENT REPORT: NORMAL
PATH REPORT.FINAL DX SPEC: NORMAL
PATH REPORT.GROSS SPEC: NORMAL
PATH REPORT.MICROSCOPIC SPEC OTHER STN: NORMAL
PATH REPORT.RELEVANT HX SPEC: NORMAL
PATH REPORT.TOTAL CANCER: NORMAL

## 2025-04-04 ENCOUNTER — HOSPITAL ENCOUNTER (INPATIENT)
Facility: HOSPITAL | Age: 50
LOS: 1 days | Discharge: HOME | End: 2025-04-05
Attending: EMERGENCY MEDICINE | Admitting: INTERNAL MEDICINE
Payer: COMMERCIAL

## 2025-04-04 ENCOUNTER — HOSPITAL ENCOUNTER (OUTPATIENT)
Dept: CARDIOLOGY | Facility: HOSPITAL | Age: 50
Discharge: HOME | DRG: 310 | End: 2025-04-04
Payer: COMMERCIAL

## 2025-04-04 ENCOUNTER — APPOINTMENT (OUTPATIENT)
Dept: RADIOLOGY | Facility: HOSPITAL | Age: 50
End: 2025-04-04
Payer: COMMERCIAL

## 2025-04-04 DIAGNOSIS — I48.91 ATRIAL FIBRILLATION WITH RVR (MULTI): Primary | ICD-10-CM

## 2025-04-04 LAB
ALBUMIN SERPL BCP-MCNC: 4.1 G/DL (ref 3.4–5)
ALP SERPL-CCNC: 70 U/L (ref 33–120)
ALT SERPL W P-5'-P-CCNC: 36 U/L (ref 10–52)
ANION GAP SERPL CALC-SCNC: 15 MMOL/L (ref 10–20)
APTT PPP: 27 SECONDS (ref 26–36)
AST SERPL W P-5'-P-CCNC: 21 U/L (ref 9–39)
BASOPHILS # BLD AUTO: 0.06 X10*3/UL (ref 0–0.1)
BASOPHILS NFR BLD AUTO: 0.5 %
BILIRUB SERPL-MCNC: 0.6 MG/DL (ref 0–1.2)
BNP SERPL-MCNC: 20 PG/ML (ref 0–99)
BUN SERPL-MCNC: 14 MG/DL (ref 6–23)
CALCIUM SERPL-MCNC: 8.6 MG/DL (ref 8.6–10.3)
CARDIAC TROPONIN I PNL SERPL HS: 5 NG/L (ref 0–20)
CARDIAC TROPONIN I PNL SERPL HS: 9 NG/L (ref 0–20)
CHLORIDE SERPL-SCNC: 104 MMOL/L (ref 98–107)
CO2 SERPL-SCNC: 22 MMOL/L (ref 21–32)
CREAT SERPL-MCNC: 0.94 MG/DL (ref 0.5–1.3)
EGFRCR SERPLBLD CKD-EPI 2021: >90 ML/MIN/1.73M*2
EOSINOPHIL # BLD AUTO: 0.29 X10*3/UL (ref 0–0.7)
EOSINOPHIL NFR BLD AUTO: 2.6 %
ERYTHROCYTE [DISTWIDTH] IN BLOOD BY AUTOMATED COUNT: 12.6 % (ref 11.5–14.5)
GLUCOSE SERPL-MCNC: 110 MG/DL (ref 74–99)
HCT VFR BLD AUTO: 46.4 % (ref 41–52)
HGB BLD-MCNC: 16.5 G/DL (ref 13.5–17.5)
IMM GRANULOCYTES # BLD AUTO: 0.04 X10*3/UL (ref 0–0.7)
IMM GRANULOCYTES NFR BLD AUTO: 0.4 % (ref 0–0.9)
INR PPP: 0.9 (ref 0.9–1.1)
LYMPHOCYTES # BLD AUTO: 3.37 X10*3/UL (ref 1.2–4.8)
LYMPHOCYTES NFR BLD AUTO: 30 %
MCH RBC QN AUTO: 30.6 PG (ref 26–34)
MCHC RBC AUTO-ENTMCNC: 35.6 G/DL (ref 32–36)
MCV RBC AUTO: 86 FL (ref 80–100)
MONOCYTES # BLD AUTO: 1.57 X10*3/UL (ref 0.1–1)
MONOCYTES NFR BLD AUTO: 14 %
NEUTROPHILS # BLD AUTO: 5.91 X10*3/UL (ref 1.2–7.7)
NEUTROPHILS NFR BLD AUTO: 52.5 %
NRBC BLD-RTO: 0 /100 WBCS (ref 0–0)
PLATELET # BLD AUTO: 213 X10*3/UL (ref 150–450)
POTASSIUM SERPL-SCNC: 3.6 MMOL/L (ref 3.5–5.3)
PROT SERPL-MCNC: 6.4 G/DL (ref 6.4–8.2)
PROTHROMBIN TIME: 10.1 SECONDS (ref 9.8–12.4)
RBC # BLD AUTO: 5.39 X10*6/UL (ref 4.5–5.9)
SODIUM SERPL-SCNC: 137 MMOL/L (ref 136–145)
WBC # BLD AUTO: 11.2 X10*3/UL (ref 4.4–11.3)

## 2025-04-04 PROCEDURE — 2060000001 HC INTERMEDIATE ICU ROOM DAILY

## 2025-04-04 PROCEDURE — 2500000005 HC RX 250 GENERAL PHARMACY W/O HCPCS: Performed by: EMERGENCY MEDICINE

## 2025-04-04 PROCEDURE — 96376 TX/PRO/DX INJ SAME DRUG ADON: CPT

## 2025-04-04 PROCEDURE — 96374 THER/PROPH/DIAG INJ IV PUSH: CPT

## 2025-04-04 PROCEDURE — 99291 CRITICAL CARE FIRST HOUR: CPT | Performed by: EMERGENCY MEDICINE

## 2025-04-04 PROCEDURE — 36415 COLL VENOUS BLD VENIPUNCTURE: CPT | Performed by: EMERGENCY MEDICINE

## 2025-04-04 PROCEDURE — 2500000005 HC RX 250 GENERAL PHARMACY W/O HCPCS

## 2025-04-04 PROCEDURE — 96361 HYDRATE IV INFUSION ADD-ON: CPT

## 2025-04-04 PROCEDURE — 84484 ASSAY OF TROPONIN QUANT: CPT | Performed by: EMERGENCY MEDICINE

## 2025-04-04 PROCEDURE — 71045 X-RAY EXAM CHEST 1 VIEW: CPT

## 2025-04-04 PROCEDURE — 93005 ELECTROCARDIOGRAM TRACING: CPT

## 2025-04-04 PROCEDURE — 2500000004 HC RX 250 GENERAL PHARMACY W/ HCPCS (ALT 636 FOR OP/ED): Performed by: EMERGENCY MEDICINE

## 2025-04-04 PROCEDURE — 80053 COMPREHEN METABOLIC PANEL: CPT | Performed by: EMERGENCY MEDICINE

## 2025-04-04 PROCEDURE — 85610 PROTHROMBIN TIME: CPT | Performed by: EMERGENCY MEDICINE

## 2025-04-04 PROCEDURE — 85025 COMPLETE CBC W/AUTO DIFF WBC: CPT | Performed by: EMERGENCY MEDICINE

## 2025-04-04 PROCEDURE — 2500000004 HC RX 250 GENERAL PHARMACY W/ HCPCS (ALT 636 FOR OP/ED)

## 2025-04-04 PROCEDURE — 85730 THROMBOPLASTIN TIME PARTIAL: CPT | Performed by: EMERGENCY MEDICINE

## 2025-04-04 PROCEDURE — 71045 X-RAY EXAM CHEST 1 VIEW: CPT | Mod: FOREIGN READ | Performed by: RADIOLOGY

## 2025-04-04 PROCEDURE — 2500000004 HC RX 250 GENERAL PHARMACY W/ HCPCS (ALT 636 FOR OP/ED): Performed by: INTERNAL MEDICINE

## 2025-04-04 PROCEDURE — 83880 ASSAY OF NATRIURETIC PEPTIDE: CPT | Performed by: EMERGENCY MEDICINE

## 2025-04-04 RX ORDER — DILTIAZEM HYDROCHLORIDE 5 MG/ML
20 INJECTION INTRAVENOUS ONCE
Status: COMPLETED | OUTPATIENT
Start: 2025-04-04 | End: 2025-04-04

## 2025-04-04 RX ORDER — HEPARIN SODIUM 10000 [USP'U]/100ML
0-4500 INJECTION, SOLUTION INTRAVENOUS CONTINUOUS
Status: DISCONTINUED | OUTPATIENT
Start: 2025-04-04 | End: 2025-04-05

## 2025-04-04 RX ORDER — HEPARIN SODIUM 10000 [USP'U]/100ML
INJECTION, SOLUTION INTRAVENOUS
Status: COMPLETED
Start: 2025-04-04 | End: 2025-04-04

## 2025-04-04 RX ORDER — ONDANSETRON 4 MG/1
4 TABLET, FILM COATED ORAL EVERY 8 HOURS PRN
Status: DISCONTINUED | OUTPATIENT
Start: 2025-04-04 | End: 2025-04-05 | Stop reason: HOSPADM

## 2025-04-04 RX ORDER — PANTOPRAZOLE SODIUM 40 MG/1
40 TABLET, DELAYED RELEASE ORAL
Status: DISCONTINUED | OUTPATIENT
Start: 2025-04-05 | End: 2025-04-05 | Stop reason: HOSPADM

## 2025-04-04 RX ORDER — PANTOPRAZOLE SODIUM 40 MG/10ML
40 INJECTION, POWDER, LYOPHILIZED, FOR SOLUTION INTRAVENOUS
Status: DISCONTINUED | OUTPATIENT
Start: 2025-04-05 | End: 2025-04-05 | Stop reason: HOSPADM

## 2025-04-04 RX ORDER — DILTIAZEM HYDROCHLORIDE 5 MG/ML
INJECTION INTRAVENOUS
Status: COMPLETED
Start: 2025-04-04 | End: 2025-04-04

## 2025-04-04 RX ORDER — ACETAMINOPHEN 325 MG/1
650 TABLET ORAL EVERY 4 HOURS PRN
Status: DISCONTINUED | OUTPATIENT
Start: 2025-04-04 | End: 2025-04-05 | Stop reason: HOSPADM

## 2025-04-04 RX ORDER — DILTIAZEM HCL/D5W 125 MG/125
PLASTIC BAG, INJECTION (ML) INTRAVENOUS
Status: COMPLETED
Start: 2025-04-04 | End: 2025-04-04

## 2025-04-04 RX ORDER — DILTIAZEM HCL/D5W 125 MG/125
5-15 PLASTIC BAG, INJECTION (ML) INTRAVENOUS CONTINUOUS
Status: DISCONTINUED | OUTPATIENT
Start: 2025-04-04 | End: 2025-04-04

## 2025-04-04 RX ORDER — DILTIAZEM HCL/D5W 125 MG/125
10 PLASTIC BAG, INJECTION (ML) INTRAVENOUS CONTINUOUS
Status: DISCONTINUED | OUTPATIENT
Start: 2025-04-04 | End: 2025-04-05

## 2025-04-04 RX ORDER — ACETAMINOPHEN 160 MG/5ML
650 SOLUTION ORAL EVERY 4 HOURS PRN
Status: DISCONTINUED | OUTPATIENT
Start: 2025-04-04 | End: 2025-04-05 | Stop reason: HOSPADM

## 2025-04-04 RX ORDER — ACETAMINOPHEN 650 MG/1
650 SUPPOSITORY RECTAL EVERY 4 HOURS PRN
Status: DISCONTINUED | OUTPATIENT
Start: 2025-04-04 | End: 2025-04-05 | Stop reason: HOSPADM

## 2025-04-04 RX ORDER — ONDANSETRON HYDROCHLORIDE 2 MG/ML
4 INJECTION, SOLUTION INTRAVENOUS EVERY 8 HOURS PRN
Status: DISCONTINUED | OUTPATIENT
Start: 2025-04-04 | End: 2025-04-05 | Stop reason: HOSPADM

## 2025-04-04 RX ORDER — KETOCONAZOLE 20 MG/ML
1 SHAMPOO, SUSPENSION TOPICAL
COMMUNITY

## 2025-04-04 RX ADMIN — DILTIAZEM HYDROCHLORIDE 20 MG: 5 INJECTION, SOLUTION INTRAVENOUS at 15:40

## 2025-04-04 RX ADMIN — HEPARIN SODIUM 1900 UNITS/HR: 10000 INJECTION, SOLUTION INTRAVENOUS at 20:01

## 2025-04-04 RX ADMIN — Medication 5 MG/HR: at 19:45

## 2025-04-04 RX ADMIN — Medication 10 MG/HR: at 19:53

## 2025-04-04 RX ADMIN — DILTIAZEM HYDROCHLORIDE 20 MG: 5 INJECTION, SOLUTION INTRAVENOUS at 18:06

## 2025-04-04 RX ADMIN — DILTIAZEM HYDROCHLORIDE 20 MG: 5 INJECTION INTRAVENOUS at 15:40

## 2025-04-04 RX ADMIN — SODIUM CHLORIDE 1000 ML: 9 INJECTION, SOLUTION INTRAVENOUS at 15:44

## 2025-04-04 SDOH — SOCIAL STABILITY: SOCIAL INSECURITY: HAS ANYONE EVER THREATENED TO HURT YOUR FAMILY OR YOUR PETS?: NO

## 2025-04-04 SDOH — SOCIAL STABILITY: SOCIAL INSECURITY: HAVE YOU HAD THOUGHTS OF HARMING ANYONE ELSE?: NO

## 2025-04-04 SDOH — SOCIAL STABILITY: SOCIAL INSECURITY: ARE THERE ANY APPARENT SIGNS OF INJURIES/BEHAVIORS THAT COULD BE RELATED TO ABUSE/NEGLECT?: NO

## 2025-04-04 SDOH — SOCIAL STABILITY: SOCIAL INSECURITY: WERE YOU ABLE TO COMPLETE ALL THE BEHAVIORAL HEALTH SCREENINGS?: YES

## 2025-04-04 SDOH — SOCIAL STABILITY: SOCIAL INSECURITY: ARE YOU OR HAVE YOU BEEN THREATENED OR ABUSED PHYSICALLY, EMOTIONALLY, OR SEXUALLY BY ANYONE?: NO

## 2025-04-04 SDOH — SOCIAL STABILITY: SOCIAL INSECURITY: DO YOU FEEL UNSAFE GOING BACK TO THE PLACE WHERE YOU ARE LIVING?: NO

## 2025-04-04 SDOH — SOCIAL STABILITY: SOCIAL INSECURITY: DO YOU FEEL ANYONE HAS EXPLOITED OR TAKEN ADVANTAGE OF YOU FINANCIALLY OR OF YOUR PERSONAL PROPERTY?: NO

## 2025-04-04 SDOH — SOCIAL STABILITY: SOCIAL INSECURITY: ABUSE: ADULT

## 2025-04-04 SDOH — SOCIAL STABILITY: SOCIAL INSECURITY: DOES ANYONE TRY TO KEEP YOU FROM HAVING/CONTACTING OTHER FRIENDS OR DOING THINGS OUTSIDE YOUR HOME?: NO

## 2025-04-04 ASSESSMENT — PAIN - FUNCTIONAL ASSESSMENT
PAIN_FUNCTIONAL_ASSESSMENT: 0-10
PAIN_FUNCTIONAL_ASSESSMENT: 0-10

## 2025-04-04 ASSESSMENT — COGNITIVE AND FUNCTIONAL STATUS - GENERAL
DAILY ACTIVITIY SCORE: 24
MOBILITY SCORE: 24
PATIENT BASELINE BEDBOUND: NO

## 2025-04-04 ASSESSMENT — ENCOUNTER SYMPTOMS
NEUROLOGICAL NEGATIVE: 1
ALLERGIC/IMMUNOLOGIC NEGATIVE: 1
GASTROINTESTINAL NEGATIVE: 1
RESPIRATORY NEGATIVE: 1
FATIGUE: 1
EYES NEGATIVE: 1
ACTIVITY CHANGE: 1
PALPITATIONS: 1
ENDOCRINE NEGATIVE: 1
PSYCHIATRIC NEGATIVE: 1
MUSCULOSKELETAL NEGATIVE: 1
HEMATOLOGIC/LYMPHATIC NEGATIVE: 1

## 2025-04-04 ASSESSMENT — PAIN SCALES - GENERAL
PAINLEVEL_OUTOF10: 0 - NO PAIN
PAINLEVEL_OUTOF10: 0 - NO PAIN

## 2025-04-04 ASSESSMENT — LIFESTYLE VARIABLES
HOW OFTEN DO YOU HAVE 6 OR MORE DRINKS ON ONE OCCASION: NEVER
AUDIT-C TOTAL SCORE: 0
AUDIT-C TOTAL SCORE: 0
HOW OFTEN DO YOU HAVE A DRINK CONTAINING ALCOHOL: NEVER
HOW MANY STANDARD DRINKS CONTAINING ALCOHOL DO YOU HAVE ON A TYPICAL DAY: PATIENT DOES NOT DRINK
SKIP TO QUESTIONS 9-10: 1

## 2025-04-04 ASSESSMENT — ACTIVITIES OF DAILY LIVING (ADL)
PATIENT'S MEMORY ADEQUATE TO SAFELY COMPLETE DAILY ACTIVITIES?: YES
WALKS IN HOME: INDEPENDENT
TOILETING: INDEPENDENT
DRESSING YOURSELF: INDEPENDENT
JUDGMENT_ADEQUATE_SAFELY_COMPLETE_DAILY_ACTIVITIES: YES
ADEQUATE_TO_COMPLETE_ADL: YES
GROOMING: INDEPENDENT
BATHING: INDEPENDENT
LACK_OF_TRANSPORTATION: NO
FEEDING YOURSELF: INDEPENDENT
HEARING - LEFT EAR: FUNCTIONAL
HEARING - RIGHT EAR: FUNCTIONAL

## 2025-04-04 ASSESSMENT — PATIENT HEALTH QUESTIONNAIRE - PHQ9
SUM OF ALL RESPONSES TO PHQ9 QUESTIONS 1 & 2: 0
1. LITTLE INTEREST OR PLEASURE IN DOING THINGS: NOT AT ALL
2. FEELING DOWN, DEPRESSED OR HOPELESS: NOT AT ALL

## 2025-04-04 NOTE — ED PROVIDER NOTES
HPI   Chief Complaint   Patient presents with    Palpitations       Is a 49-year-old man with past medical history of episode of proximal A-fib he does present with complaint of palpitations.  X 2 hours prior to arrival.  denies any fever chills.  Denies any chest pain.  Has abdominal pain.  Denies abdominal pain.  No PND, orthopnea, SMITH.  No ripping station the back or focal neurofindings.              Patient History   No past medical history on file.  Past Surgical History:   Procedure Laterality Date    COLONOSCOPY  2023    next colonoscopy due in 7 years    OTHER SURGICAL HISTORY  01/18/2022    Knee surgery     No family history on file.  Social History     Tobacco Use    Smoking status: Some Days     Types: Cigars    Smokeless tobacco: Never   Substance Use Topics    Alcohol use: Not on file    Drug use: Not on file       Physical Exam   ED Triage Vitals [04/04/25 1535]   Temperature Heart Rate Respirations BP   36.8 °C (98.2 °F) (!) 165 (!) 21 107/82      Pulse Ox Temp Source Heart Rate Source Patient Position   99 % Oral -- --      BP Location FiO2 (%)     -- --       Physical Exam  Vitals and nursing note reviewed.   Constitutional:       General: He is not in acute distress.     Appearance: Normal appearance. He is normal weight. He is not ill-appearing.   HENT:      Head: Normocephalic and atraumatic.      Nose: Nose normal.      Mouth/Throat:      Mouth: Mucous membranes are moist.   Eyes:      Extraocular Movements: Extraocular movements intact.      Conjunctiva/sclera: Conjunctivae normal.      Pupils: Pupils are equal, round, and reactive to light.   Cardiovascular:      Rate and Rhythm: Rhythm irregular.      Heart sounds: Normal heart sounds.   Pulmonary:      Effort: Pulmonary effort is normal.      Breath sounds: Normal breath sounds.   Abdominal:      General: Abdomen is flat. Bowel sounds are normal. There is no distension.      Palpations: Abdomen is soft.      Tenderness: There is no abdominal  tenderness. There is no right CVA tenderness, left CVA tenderness, guarding or rebound.   Musculoskeletal:         General: No tenderness or deformity. Normal range of motion.      Cervical back: Normal range of motion and neck supple.   Skin:     General: Skin is warm and dry.      Capillary Refill: Capillary refill takes less than 2 seconds.      Coloration: Skin is not pale.      Findings: No bruising.   Neurological:      General: No focal deficit present.      Mental Status: He is alert and oriented to person, place, and time. Mental status is at baseline.      Sensory: No sensory deficit.      Motor: No weakness.   Psychiatric:         Mood and Affect: Mood normal.         Behavior: Behavior normal.         Thought Content: Thought content normal.         Judgment: Judgment normal.           ED Course & MDM   Diagnoses as of 04/04/25 2356   Atrial fibrillation with RVR (Multi)                 No data recorded     Julius Coma Scale Score: 15 (04/04/25 1537 : Raeann Mendoza RN)                           Medical Decision Making  EKG interpreted by me showed atrial fibrillation A-fib at a rate of 171 with no acute ischemic change.  No STEMI.  IV is placed and labs are drawn including CBC, CMP, troponin, BNP, chest x-ray and given 20 mg of diltiazem for A-fib  Patient does have CBC does not show elevated blood cell count stomach infection.  No clinically significant anemia troponin negative x 2 for ACS.  No acute kidney injury.  BNP stable at 20.  Chest x-ray negative for acute pathology and patient is started on heparin drip, diltiazem drip and will be admitted for echo and cardiac evaluation and possible cardioversion    Amount and/or Complexity of Data Reviewed  Labs: ordered. Decision-making details documented in ED Course.  Radiology: ordered. Decision-making details documented in ED Course.  ECG/medicine tests: ordered. Decision-making details documented in ED Course.        Procedure  Critical  Care    Performed by: Ralph Jansen MD  Authorized by: Ralph Jansen MD    Critical care provider statement:     Critical care time (minutes):  45    Critical care time was exclusive of:  Separately billable procedures and treating other patients and teaching time    Critical care was necessary to treat or prevent imminent or life-threatening deterioration of the following conditions:  Cardiac failure    Critical care was time spent personally by me on the following activities:  Blood draw for specimens, development of treatment plan with patient or surrogate, discussions with consultants, evaluation of patient's response to treatment, examination of patient, ordering and performing treatments and interventions, ordering and review of laboratory studies, ordering and review of radiographic studies, pulse oximetry and re-evaluation of patient's condition    Care discussed with: admitting provider    Comments:      Did attempt cardioversion x 2 with IV push diltiazem and started on diltiazem drip and heparin ggt and admitted to stepdown for A-fib with RVR       Ralph Jansen MD  04/04/25 1660

## 2025-04-04 NOTE — H&P
History Of Present Illness  Alfredo Stevens is a 49 y.o. male with a past medical history of paroxysmal atrial fibrillation, who presented to [facility] ER with palpitations that began 2 hours prior to his arrival in the ER.  He denies any fever chills chest pain shortness of breath.  No urinary or neurological findings.     Past Medical History  No past medical history on file.     Surgical History  Past Surgical History:   Procedure Laterality Date    COLONOSCOPY  2023    next colonoscopy due in 7 years    OTHER SURGICAL HISTORY  01/18/2022    Knee surgery         Social History  He reports that he has been smoking cigars. He has never used smokeless tobacco. No history on file for alcohol use and drug use.    Family History  No family history on file.     Allergies  Patient has no known allergies.    Review of Systems   Constitutional:  Positive for activity change and fatigue.   HENT: Negative.     Eyes: Negative.    Respiratory: Negative.     Cardiovascular:  Positive for palpitations.   Gastrointestinal: Negative.    Endocrine: Negative.    Genitourinary: Negative.    Musculoskeletal: Negative.    Skin: Negative.    Allergic/Immunologic: Negative.    Neurological: Negative.    Hematological: Negative.    Psychiatric/Behavioral: Negative.     All other systems reviewed and are negative.       Physical Exam  Vitals and nursing note reviewed.   Constitutional:       Appearance: Normal appearance.   HENT:      Head: Normocephalic.      Right Ear: External ear normal.      Left Ear: External ear normal.      Nose: Nose normal.      Mouth/Throat:      Mouth: Mucous membranes are dry.      Pharynx: Oropharynx is clear.   Eyes:      Extraocular Movements: Extraocular movements intact.      Conjunctiva/sclera: Conjunctivae normal.      Pupils: Pupils are equal, round, and reactive to light.   Cardiovascular:      Rate and Rhythm: Tachycardia present. Rhythm irregular.      Comments: Monitor A-fib rate  "109  Pulmonary:      Effort: Pulmonary effort is normal.      Breath sounds: Normal breath sounds.   Abdominal:      General: Abdomen is flat. Bowel sounds are normal.      Palpations: Abdomen is soft.   Musculoskeletal:         General: Normal range of motion.   Skin:     General: Skin is warm and dry.   Neurological:      General: No focal deficit present.      Mental Status: He is alert. Mental status is at baseline.   Psychiatric:         Mood and Affect: Mood normal.         Behavior: Behavior normal.          Last Recorded Vitals  Blood pressure 102/83, pulse (!) 109, temperature 36.8 °C (98.2 °F), temperature source Oral, resp. rate 16, height 1.854 m (6' 1\"), weight 107 kg (235 lb), SpO2 98%.    Relevant Results  Meds:  Scheduled medications  dilTIAZem in dextrose 5 %, , ,   heparin (porcine), , ,   heparin, 80 Units/kg, intravenous, Once  [START ON 4/5/2025] pantoprazole, 40 mg, oral, Daily before breakfast   Or  [START ON 4/5/2025] pantoprazole, 40 mg, intravenous, Daily before breakfast      Continuous medications  dilTIAZem, 5-15 mg/hr  heparin, 0-4,500 Units/hr      PRN medications  PRN medications: acetaminophen **OR** acetaminophen **OR** acetaminophen, dilTIAZem in dextrose 5 %, heparin (porcine), heparin, ondansetron **OR** ondansetron   Current Outpatient Medications   Medication Instructions    valACYclovir (Valtrex) 1 gram tablet Take 2 tabs twice a day for 1 day as needed        Labs:  Results for orders placed or performed during the hospital encounter of 04/04/25 (from the past 24 hours)   CBC and Auto Differential   Result Value Ref Range    WBC 11.2 4.4 - 11.3 x10*3/uL    nRBC 0.0 0.0 - 0.0 /100 WBCs    RBC 5.39 4.50 - 5.90 x10*6/uL    Hemoglobin 16.5 13.5 - 17.5 g/dL    Hematocrit 46.4 41.0 - 52.0 %    MCV 86 80 - 100 fL    MCH 30.6 26.0 - 34.0 pg    MCHC 35.6 32.0 - 36.0 g/dL    RDW 12.6 11.5 - 14.5 %    Platelets 213 150 - 450 x10*3/uL    Neutrophils % 52.5 40.0 - 80.0 %    Immature " Granulocytes %, Automated 0.4 0.0 - 0.9 %    Lymphocytes % 30.0 13.0 - 44.0 %    Monocytes % 14.0 2.0 - 10.0 %    Eosinophils % 2.6 0.0 - 6.0 %    Basophils % 0.5 0.0 - 2.0 %    Neutrophils Absolute 5.91 1.20 - 7.70 x10*3/uL    Immature Granulocytes Absolute, Automated 0.04 0.00 - 0.70 x10*3/uL    Lymphocytes Absolute 3.37 1.20 - 4.80 x10*3/uL    Monocytes Absolute 1.57 (H) 0.10 - 1.00 x10*3/uL    Eosinophils Absolute 0.29 0.00 - 0.70 x10*3/uL    Basophils Absolute 0.06 0.00 - 0.10 x10*3/uL   Comprehensive metabolic panel   Result Value Ref Range    Glucose 110 (H) 74 - 99 mg/dL    Sodium 137 136 - 145 mmol/L    Potassium 3.6 3.5 - 5.3 mmol/L    Chloride 104 98 - 107 mmol/L    Bicarbonate 22 21 - 32 mmol/L    Anion Gap 15 10 - 20 mmol/L    Urea Nitrogen 14 6 - 23 mg/dL    Creatinine 0.94 0.50 - 1.30 mg/dL    eGFR >90 >60 mL/min/1.73m*2    Calcium 8.6 8.6 - 10.3 mg/dL    Albumin 4.1 3.4 - 5.0 g/dL    Alkaline Phosphatase 70 33 - 120 U/L    Total Protein 6.4 6.4 - 8.2 g/dL    AST 21 9 - 39 U/L    Bilirubin, Total 0.6 0.0 - 1.2 mg/dL    ALT 36 10 - 52 U/L   Protime-INR   Result Value Ref Range    Protime 10.1 9.8 - 12.4 seconds    INR 0.9 0.9 - 1.1   aPTT   Result Value Ref Range    aPTT 27 26 - 36 seconds   B-Type Natriuretic Peptide   Result Value Ref Range    BNP 20 0 - 99 pg/mL   Troponin I, High Sensitivity, Initial   Result Value Ref Range    Troponin I, High Sensitivity 5 0 - 20 ng/L   Troponin, High Sensitivity, 1 Hour   Result Value Ref Range    Troponin I, High Sensitivity 9 0 - 20 ng/L      Imaging:  Imaging  XR chest 1 view    Result Date: 4/4/2025  No acute process. Signed by Evaristo Whelan MD     Cardiology, Vascular, and Other Imaging  No other imaging results found for the past 2 days       Assessment/Plan   Paroxysmal atrial fibrillation with RVR  Plan:  He needs to be on an oral anticoagulant, recommend Eliquis  Currently he is on IV heparin drip  His BP is soft    DVT prophylaxis  Plan:  Covered with  IV heparin drip  SCDs    I spent 60 minutes in the professional and overall care of this patient.      Doyle Cornelius DO

## 2025-04-04 NOTE — ED TRIAGE NOTES
Pt arrived to ED via EMS from home with c/o palpitations and high HR. Pt reports that sx began around 1345 this afternoon. Pt states he felt this once before and was sent home with holter monitor with no capture. Pt alert and oriented x4.

## 2025-04-05 ENCOUNTER — PHARMACY VISIT (OUTPATIENT)
Dept: PHARMACY | Facility: CLINIC | Age: 50
End: 2025-04-05
Payer: COMMERCIAL

## 2025-04-05 VITALS
SYSTOLIC BLOOD PRESSURE: 118 MMHG | DIASTOLIC BLOOD PRESSURE: 88 MMHG | WEIGHT: 235 LBS | OXYGEN SATURATION: 97 % | BODY MASS INDEX: 31.14 KG/M2 | HEIGHT: 73 IN | TEMPERATURE: 98.2 F | HEART RATE: 89 BPM | RESPIRATION RATE: 18 BRPM

## 2025-04-05 LAB
ANION GAP SERPL CALC-SCNC: 11 MMOL/L (ref 10–20)
BUN SERPL-MCNC: 12 MG/DL (ref 6–23)
CALCIUM SERPL-MCNC: 8.4 MG/DL (ref 8.6–10.3)
CHLORIDE SERPL-SCNC: 106 MMOL/L (ref 98–107)
CO2 SERPL-SCNC: 25 MMOL/L (ref 21–32)
CREAT SERPL-MCNC: 0.88 MG/DL (ref 0.5–1.3)
EGFRCR SERPLBLD CKD-EPI 2021: >90 ML/MIN/1.73M*2
ERYTHROCYTE [DISTWIDTH] IN BLOOD BY AUTOMATED COUNT: 12.9 % (ref 11.5–14.5)
ERYTHROCYTE [DISTWIDTH] IN BLOOD BY AUTOMATED COUNT: 13 % (ref 11.5–14.5)
GLUCOSE SERPL-MCNC: 110 MG/DL (ref 74–99)
HCT VFR BLD AUTO: 45.5 % (ref 41–52)
HCT VFR BLD AUTO: 45.8 % (ref 41–52)
HGB BLD-MCNC: 15.7 G/DL (ref 13.5–17.5)
HGB BLD-MCNC: 15.8 G/DL (ref 13.5–17.5)
MCH RBC QN AUTO: 30.2 PG (ref 26–34)
MCH RBC QN AUTO: 30.6 PG (ref 26–34)
MCHC RBC AUTO-ENTMCNC: 34.3 G/DL (ref 32–36)
MCHC RBC AUTO-ENTMCNC: 34.7 G/DL (ref 32–36)
MCV RBC AUTO: 88 FL (ref 80–100)
MCV RBC AUTO: 88 FL (ref 80–100)
NRBC BLD-RTO: 0 /100 WBCS (ref 0–0)
NRBC BLD-RTO: 0 /100 WBCS (ref 0–0)
PLATELET # BLD AUTO: 204 X10*3/UL (ref 150–450)
PLATELET # BLD AUTO: 212 X10*3/UL (ref 150–450)
POTASSIUM SERPL-SCNC: 3.8 MMOL/L (ref 3.5–5.3)
RBC # BLD AUTO: 5.17 X10*6/UL (ref 4.5–5.9)
RBC # BLD AUTO: 5.2 X10*6/UL (ref 4.5–5.9)
SODIUM SERPL-SCNC: 138 MMOL/L (ref 136–145)
UFH PPP CHRO-ACNC: 0.5 IU/ML
UFH PPP CHRO-ACNC: 0.6 IU/ML
UFH PPP CHRO-ACNC: 0.8 IU/ML
WBC # BLD AUTO: 10.9 X10*3/UL (ref 4.4–11.3)
WBC # BLD AUTO: 9.4 X10*3/UL (ref 4.4–11.3)

## 2025-04-05 PROCEDURE — 36415 COLL VENOUS BLD VENIPUNCTURE: CPT | Performed by: INTERNAL MEDICINE

## 2025-04-05 PROCEDURE — 85027 COMPLETE CBC AUTOMATED: CPT | Performed by: INTERNAL MEDICINE

## 2025-04-05 PROCEDURE — 80048 BASIC METABOLIC PNL TOTAL CA: CPT | Performed by: INTERNAL MEDICINE

## 2025-04-05 PROCEDURE — 99255 IP/OBS CONSLTJ NEW/EST HI 80: CPT | Performed by: INTERNAL MEDICINE

## 2025-04-05 PROCEDURE — 99239 HOSP IP/OBS DSCHRG MGMT >30: CPT | Performed by: HOSPITALIST

## 2025-04-05 PROCEDURE — 85520 HEPARIN ASSAY: CPT | Performed by: INTERNAL MEDICINE

## 2025-04-05 PROCEDURE — 2500000004 HC RX 250 GENERAL PHARMACY W/ HCPCS (ALT 636 FOR OP/ED): Performed by: INTERNAL MEDICINE

## 2025-04-05 PROCEDURE — RXMED WILLOW AMBULATORY MEDICATION CHARGE

## 2025-04-05 PROCEDURE — 2500000001 HC RX 250 WO HCPCS SELF ADMINISTERED DRUGS (ALT 637 FOR MEDICARE OP): Performed by: HOSPITALIST

## 2025-04-05 RX ORDER — METOPROLOL SUCCINATE 50 MG/1
50 TABLET, EXTENDED RELEASE ORAL DAILY
Status: DISCONTINUED | OUTPATIENT
Start: 2025-04-05 | End: 2025-04-05 | Stop reason: HOSPADM

## 2025-04-05 RX ORDER — FLECAINIDE ACETATE 100 MG/1
300 TABLET ORAL ONCE AS NEEDED
Qty: 3 TABLET | Refills: 1 | Status: SHIPPED | OUTPATIENT
Start: 2025-04-05

## 2025-04-05 RX ORDER — METOPROLOL SUCCINATE 50 MG/1
50 TABLET, EXTENDED RELEASE ORAL DAILY
Qty: 30 TABLET | Refills: 0 | Status: SHIPPED | OUTPATIENT
Start: 2025-04-05 | End: 2025-05-05

## 2025-04-05 RX ADMIN — APIXABAN 5 MG: 5 TABLET, FILM COATED ORAL at 10:39

## 2025-04-05 RX ADMIN — METOPROLOL SUCCINATE 50 MG: 50 TABLET, EXTENDED RELEASE ORAL at 10:39

## 2025-04-05 RX ADMIN — Medication 10 MG/HR: at 07:47

## 2025-04-05 RX ADMIN — HEPARIN SODIUM 1700 UNITS/HR: 10000 INJECTION, SOLUTION INTRAVENOUS at 05:04

## 2025-04-05 ASSESSMENT — COGNITIVE AND FUNCTIONAL STATUS - GENERAL
MOBILITY SCORE: 24
DAILY ACTIVITIY SCORE: 24

## 2025-04-05 ASSESSMENT — PAIN - FUNCTIONAL ASSESSMENT
PAIN_FUNCTIONAL_ASSESSMENT: 0-10

## 2025-04-05 ASSESSMENT — PAIN SCALES - GENERAL
PAINLEVEL_OUTOF10: 0 - NO PAIN

## 2025-04-05 NOTE — PROGRESS NOTES
Pharmacy Medication History     Source of Information: Patient and spouse bedside     Additional concerns with the patient's PTA list.   N/A  Notified Provider via Haiku : No    The following updates were made to the Prior to Admission medication list:     Medications ADDED:   Ketoconazole 2% shampoo   Medications CHANGED:  N/A  Medications REMOVED:   N/A  Medications NOT TAKING:   Valacyclovir 1 gram tablet ( hasn't taken for more than a month)    Allergy reviewed : Yes    Meds 2 Beds : Yes    Outpatient pharmacy confirmed and updated in chart : Yes    Pharmacy name: [facility] ( [place] [address]    The list below reflectives the updated PTA list. Please review each medication in order reconciliation for additional clarification and justification.    Prior to Admission Medications   Prescriptions Last Dose Informant   ketoconazole (NIZOral) 2 % shampoo Past Week    Sig: Apply 1 Application topically. Use 2- 3 times weekly in the shower leave on for 5 minutes and then rinse   valACYclovir (Valtrex) 1 gram tablet More than a month    Sig: Take 2 tabs twice a day for 1 day as needed   Patient not taking: Reported on 4/4/2025      Facility-Administered Medications: None       The list below reflectives the updated allergy list. Please review each documented allergy for additional clarification and justification.    No Known Allergies       04/04/25 at 8:17 PM - Elysia Pickard

## 2025-04-05 NOTE — NURSING NOTE

## 2025-04-05 NOTE — CONSULTS
"Inpatient consult to cardiology  Consult performed by: Chacho Costa DO  Consult ordered by: Doyle Cornelius DO  Reason for consult: Atrial Fibrillation  Assessment/Recommendations: Paroxysmal Atrial Fibrillation with RVR   [id number] Stroke Risk Points: 0   Values used to calculate this score:    Points  Metrics       0        Has Congestive Heart Failure: No       0        Has Hypertension: No       0        Age: 49       0        Has Diabetes: No       0        Had Stroke: No                 Had TIA: No                 Had Thromboembolism: No       0        Has Vascular Disease: No       0        Clinically Relevant Sex: Male  -- For now, please begin Apixaban 5 mg BID for CVA protection while investigations underway   -- Please Stop Diltiazem and convert to Metoprolol 50 mg PO Daily   -- Will Prescribe Flecainide 300 mg for \"Pill In Pocket\"  approach X 1 dose and if unsuccessful, present to Er 4 hours after dose   -- Decrease alcohol consumption  -- Given the patient's symptoms and in order to further evaluate possible arrhythmias, we will plan to perform an event monitor.  -- We will obtain a transthoracic echocardiogram for structural evaluation including ejection fraction, assessment of regional wall motion abnormalities or valvular disease, and further evaluation of hemodynamics.  -- He is closer to [place], will have him follow up with [facility] for care            History Of Present Illness:    Alfredo Stevens is a 49 y.o. male Pertinent medical history notable for heart palpitations who presented to the ER with similar complaint.  There were more intense, longer in his head in the past.  Initial EKG suggested atrial fibrillation.  He was started on high intensity heparin drip and given IV diltiazem although this did not slow his rates down, this did not result in restoration of sinus rhythm.  He was subsequently started on a diltiazem drip, and admitted.  Overnight, he remained in atrial " "fibrillation until roughly 09 and this morning when he converted back into sinus rhythm.  There were no postconversion pauses.  When seen, he feels much better.     Last Recorded Vitals:  Vitals:    25 2207 25 0034 25 0504 25 0756   BP: 105/62 111/75 119/73 118/88   BP Location: Left arm Left arm Left arm Left arm   Patient Position: Lying Lying Lying Lying   Pulse: (!) 123 81 83 89   Resp: 19 18 18 18   Temp: 36.6 °C (97.9 °F) 36.8 °C (98.2 °F) 36.4 °C (97.5 °F) 36.8 °C (98.2 °F)   TempSrc: Temporal Oral Temporal Oral   SpO2: 96% 95% 99% 97%   Weight:       Height:           Last Labs:  CBC - 2025:  7:59 AM  9.4 15.7 204    45.8      CMP - 2025:  4:04 AM  8.4 6.4 21 --- 0.6   _ 4.1 36 70      PTT - 2025:  3:45 PM  0.9   10.1 27     Troponin I, High Sensitivity   Date/Time Value Ref Range Status   2025 05:23 PM 9 0 - 20 ng/L Final   2025 03:45 PM 5 0 - 20 ng/L Final     BNP   Date/Time Value Ref Range Status   2025 03:45 PM 20 0 - 99 pg/mL Final      Last I/O:  I/O last 3 completed shifts:  In: 1000 (9.4 mL/kg) [IV Piggyback:1000]  Out: - (0 mL/kg)   Weight: 106.6 kg     Past Cardiology Tests (Last 3 Years):  EK2025 --> appears to be atrial fibrillation with RVR rates 171 with some respiratory variation noted      Event monitor 10/2024  Prescribed time 14 days diagnostic time 11 days 2 hours 57 minutes  Total analyzed beat count 1,391,000 136  Minimum heart rate 54 beats maximum heart rate 150 beats average heart rate 89 beats  3351 ventricular ectopic beats burden less than 1%  54 supraventricular ectopic beats burden less than 1%  18 patient triggers correlating to sinus tachycardia ventricular ectopy.  No atrial fibrillation was detected on the study    EKG 2024      Echo:  No results found for this or any previous visit from the past 1095 days.    Ejection Fractions:  No results found for: \"EF\"  Cath:  No results found for this or any previous " "visit from the past 1095 days.    Stress Test:  No results found for this or any previous visit from the past 1095 days.    Cardiac Imaging:  No results found for this or any previous visit from the past 1095 days.      Past Medical History:  He has no past medical history on file.    Past Surgical History:  He has a past surgical history that includes Other surgical history (01/18/2022) and Colonoscopy (2023).      Social History:  He reports that he has been smoking cigars. He has never used smokeless tobacco. No history on file for alcohol use and drug use.    Family History:  No family history on file.     Allergies:  Patient has no known allergies.    Inpatient Medications:  Scheduled medications   Medication Dose Route Frequency    pantoprazole  40 mg oral Daily before breakfast    Or    pantoprazole  40 mg intravenous Daily before breakfast     PRN medications   Medication    acetaminophen    Or    acetaminophen    Or    acetaminophen    heparin    ondansetron    Or    ondansetron     Continuous Medications   Medication Dose Last Rate    dilTIAZem  10 mg/hr 10 mg/hr (04/05/25 4330)    heparin  0-4,500 Units/hr 1,700 Units/hr (04/05/25 1291)     Outpatient Medications:  Current Outpatient Medications   Medication Instructions    ketoconazole (NIZOral) 2 % shampoo 1 Application    valACYclovir (Valtrex) 1 gram tablet Take 2 tabs twice a day for 1 day as needed       Physical Exam:  Documented Vital Signs   Heart Rate:  []   Temp:  [36.4 °C (97.5 °F)-36.8 °C (98.2 °F)]   Resp:  [8-21]   BP: ()/(62-88)   Height:  [185.4 cm (6' 1\")]   Weight:  [107 kg (235 lb)]   SpO2:  [95 %-100 %]   Temp:  [36.4 °C (97.5 °F)-36.8 °C (98.2 °F)] 36.8 °C (98.2 °F)  Heart Rate:  [] 89  Resp:  [8-21] 18  BP: ()/(62-88) 118/88     Documented Fluid Status     Intake/Output Summary (Last 24 hours) at 4/5/2025 6752  Last data filed at 4/4/2025 1717  Gross per 24 hour   Intake 1000 ml   Output --   Net 1000 ml "     Net IO Since Admission: 1,000 mL [04/05/25 0928]  PHYSICAL EXAMINATION    GENERAL APPEARANCE: Well developed, well nourished, in no acute distress.  VITAL SIGNS: reviewed and confirmed.   SKIN: Inspection of the skin reveals no rashes, ulcerations or petechiae.  HEENT: The sclerae were anicteric and conjunctivae were pink and moist. Extraocular movements were intact and pupils were equal, round, and reactive to light with normal accommodation. External inspection of the ears and nose showed no scars, lesions, or masses.   NECK: Supple and symmetric. There was no thyroid enlargement, and no tenderness, or masses were felt.  CHEST: Normal AP diameter and normal contour without any kyphoscoliosis.  LUNGS: Auscultation of the lungs revealed normal breath sounds without any other adventitious sounds or rubs.  CARDIOVASCULAR: There was a regular rate and rhythm without any murmurs, gallops, rubs. The carotid pulses were normal and 2+ bilaterally without bruits. Peripheral pulses were 2+ and symmetric.  ABDOMEN: Soft and nontender with normal bowel sounds.  LYMPH NODES: No lymphadenopathy was appreciated in the neck  MUSCULOSKELETAL: Gait was normal. There was no tenderness or effusions noted. Muscle strength and tone were normal.  EXTREMITIES: No cyanosis, clubbing or edema.  NEUROLOGIC: Alert and oriented x 3. Normal affect. Gait was normal. Normal deep tendon reflexes with no pathological reflexes. Sensation to touch was normal.     Assessment/Plan   Alfredo Stevens is a 49 y.o. male Pertinent medical history notable for heart palpitations who presented to the ER with similar complaint.  There were more intense, longer in his head in the past.  Initial EKG suggested atrial fibrillation.  He was started on high intensity heparin drip and given IV diltiazem although this did not slow his rates down, this did not result in restoration of sinus rhythm.  He was subsequently started on a diltiazem drip, and admitted.   "Overnight, he remained in atrial fibrillation until roughly 09 and this morning when he converted back into sinus rhythm.  There were no postconversion pauses.  When seen, he feels much better.    Paroxysmal Atrial Fibrillation with RVR   [id number] Stroke Risk Points: 0   Values used to calculate this score:    Points  Metrics       0        Has Congestive Heart Failure: No       0        Has Hypertension: No       0        Age: 49       0        Has Diabetes: No       0        Had Stroke: No                 Had TIA: No                 Had Thromboembolism: No       0        Has Vascular Disease: No       0        Clinically Relevant Sex: Male  -- For now, please begin Apixaban 5 mg BID for CVA protection while investigations underway   -- Please Stop Diltiazem and convert to Metoprolol 50 mg PO Daily   -- Will Prescribe Flecainide 300 mg for \"Pill In Pocket\"  approach X 1 dose and if unsuccessful, present to Er 4 hours after dose   -- Decrease alcohol consumption  -- Given the patient's symptoms and in order to further evaluate possible arrhythmias, we will plan to perform an event monitor.  -- We will obtain a transthoracic echocardiogram for structural evaluation including ejection fraction, assessment of regional wall motion abnormalities or valvular disease, and further evaluation of hemodynamics.  -- He is closer to [place], will have him follow up with [facility] for care  -- OK to discharge             Peripheral IV 04/04/25 18 G Distal;Right;Upper;Anterior Arm (Active)   Site Assessment Clean;Dry 04/05/25 0800   Dressing Type Tape;Transparent 04/05/25 0800   Line Status Flushed;Infusing 04/05/25 0800   Dressing Status Clean;Dry 04/05/25 0800   Number of days: 1       Peripheral IV 04/04/25 20 G Distal;Left;Upper Arm (Active)   Site Assessment Clean;Dry;Intact 04/05/25 0800   Dressing Type Tape;Transparent 04/05/25 0800   Line Status Infusing 04/05/25 0800   Dressing Status Clean;Dry 04/05/25 0800   Number " of days: 1       Code Status:  Full Code      Chacho Costa DO   Division of Cardiovascular Medicine  [facility] [place]

## 2025-04-05 NOTE — DISCHARGE SUMMARY
Discharge Diagnosis  Atrial fibrillation with RVR  Mild alcohol use disorder- advised on decreasing alcohol consumption  Tobacco use- cigars 2x weekly; cessation advised    Issues Requiring Follow-Up  OP follow up with pcp, cardiology  OP ECHO  OP Holter monitor    Discharge Meds     Medication List      START taking these medications     apixaban 5 mg tablet; Commonly known as: Eliquis; Take 1 tablet (5 mg)   by mouth 2 times a day.   metoprolol succinate XL 50 mg 24 hr tablet; Commonly known as:   Toprol-XL; Take 1 tablet (50 mg) by mouth once daily. Do not crush or   chew.     CONTINUE taking these medications     ketoconazole 2 % shampoo; Commonly known as: NIZOral     STOP taking these medications     valACYclovir 1 gram tablet; Commonly known as: Valtrex       Test Results Pending At Discharge  Pending Labs       No current pending labs.            Hospital Course  49 y.o. male who presented to [facility] ER with palpitations that began 2 hours prior to his arrival in the ER.     Previously had a 5 hour episode of irregular heart beat in March 2024 that spontaneously resolved.  2 week holter monitor Sept 2024 which showed no afib thus was not placed on any long term meds.     This admission he presents with recurrence of palpitations. EKG showed afib with rvr. No improvement with IV cardizem x2 thus placed on cardizem gtt. This am around 9:10am he converted to NSR. Cardiology evaluated and recommend transition to PO Torpol XL 50mg daily. Although [id number] score is 0, cardiology recommends 2 month course of PO eliquis until ECHO/Holter are done. Further decision on duration of OAC per OP cardiologist.     Pertinent Physical Exam At Time of Discharge  Physical Exam  Vitals and nursing note reviewed.   HENT:      Mouth/Throat:      Mouth: Mucous membranes are moist.      Pharynx: Oropharynx is clear.   Cardiovascular:      Rate and Rhythm: Normal rate and regular rhythm.   Pulmonary:      Effort:  Pulmonary effort is normal.   Abdominal:      Palpations: Abdomen is soft.   Neurological:      Mental Status: He is alert and oriented to person, place, and time.         Outpatient Follow-Up  No future appointments.    DISCHARGE TIME: > 30 minutes    Jesus Penaloza, DO

## 2025-04-07 ENCOUNTER — ANCILLARY PROCEDURE (OUTPATIENT)
Dept: CARDIOLOGY | Facility: CLINIC | Age: 50
End: 2025-04-07
Payer: COMMERCIAL

## 2025-04-07 ENCOUNTER — HOSPITAL ENCOUNTER (OUTPATIENT)
Dept: CARDIOLOGY | Facility: CLINIC | Age: 50
Discharge: HOME | End: 2025-04-07
Payer: COMMERCIAL

## 2025-04-07 ENCOUNTER — PATIENT OUTREACH (OUTPATIENT)
Dept: CARE COORDINATION | Facility: CLINIC | Age: 50
End: 2025-04-07
Payer: COMMERCIAL

## 2025-04-07 DIAGNOSIS — I48.91 ATRIAL FIBRILLATION WITH RVR (MULTI): ICD-10-CM

## 2025-04-07 PROCEDURE — 93247 EXT ECG>7D<15D SCAN A/R: CPT

## 2025-04-07 NOTE — PROGRESS NOTES
Wrap Up  Wrap Up Additional Comments: Alfredo reports he is doing much better, has all of his scripts via meds to beds, has scheduled all of his follow up appts and is monitoring his HR.  No needs at this time (4/7/2025 11:18 AM)    Medications  Medications reviewed with patient/caregiver?: Yes (4/7/2025 11:18 AM)  Is the patient having any side effects they believe may be caused by any medication additions or changes?: No (4/7/2025 11:18 AM)  Does the patient have all medications ordered at discharge?: Yes (4/7/2025 11:18 AM)  Care Management Interventions: No intervention needed (4/7/2025 11:18 AM)  Is the patient taking all medications as directed (includes completed medication regime)?: Yes (4/7/2025 11:18 AM)    Appointments  Does the patient have a primary care provider?: Yes (4/7/2025 11:18 AM)    Patient Teaching  Does the patient have access to their discharge instructions?: Yes (4/7/2025 11:18 AM)  Care Management Interventions: Reviewed instructions with patient (4/7/2025 11:18 AM)  What is the patient's perception of their health status since discharge?: Returned to baseline/stable (4/7/2025 11:18 AM)      Mckenzie Osuna RN [facility]  [phone]

## 2025-04-11 ENCOUNTER — HOSPITAL ENCOUNTER (OUTPATIENT)
Dept: CARDIOLOGY | Facility: CLINIC | Age: 50
Discharge: HOME | End: 2025-04-11
Payer: COMMERCIAL

## 2025-04-11 DIAGNOSIS — I48.91 ATRIAL FIBRILLATION WITH RVR (MULTI): ICD-10-CM

## 2025-04-11 LAB
EJECTION FRACTION APICAL 4 CHAMBER: 48.5
EJECTION FRACTION: 60 %
LEFT ATRIUM VOLUME AREA LENGTH INDEX BSA: 21.5 ML/M2
LEFT VENTRICLE INTERNAL DIMENSION DIASTOLE: 4.5 CM (ref 3.5–6)
LEFT VENTRICULAR OUTFLOW TRACT DIAMETER: 2.5 CM
MITRAL VALVE E/A RATIO: 1.03
RIGHT VENTRICLE FREE WALL PEAK S': 13.6 CM/S
TRICUSPID ANNULAR PLANE SYSTOLIC EXCURSION: 2.2 CM

## 2025-04-16 LAB
Q ONSET: 224 MS
QRS COUNT: 27 BEATS
QRS DURATION: 98 MS
QT INTERVAL: 262 MS
QTC CALCULATION(BAZETT): 441 MS
QTC FREDERICIA: 371 MS
R AXIS: 72 DEGREES
T AXIS: 16 DEGREES
T OFFSET: 355 MS
VENTRICULAR RATE: 171 BPM

## 2025-04-22 ENCOUNTER — APPOINTMENT (OUTPATIENT)
Dept: PRIMARY CARE | Facility: CLINIC | Age: 50
End: 2025-04-22
Payer: COMMERCIAL

## 2025-04-22 ENCOUNTER — PATIENT OUTREACH (OUTPATIENT)
Dept: CARE COORDINATION | Facility: CLINIC | Age: 50
End: 2025-04-22

## 2025-04-22 ENCOUNTER — OFFICE VISIT (OUTPATIENT)
Dept: CARDIOLOGY | Facility: CLINIC | Age: 50
End: 2025-04-22
Payer: COMMERCIAL

## 2025-04-22 VITALS
TEMPERATURE: 97.9 F | DIASTOLIC BLOOD PRESSURE: 81 MMHG | HEART RATE: 73 BPM | SYSTOLIC BLOOD PRESSURE: 124 MMHG | WEIGHT: 238 LBS | HEIGHT: 73 IN | BODY MASS INDEX: 31.54 KG/M2 | OXYGEN SATURATION: 97 %

## 2025-04-22 VITALS
HEIGHT: 73 IN | HEART RATE: 80 BPM | DIASTOLIC BLOOD PRESSURE: 80 MMHG | OXYGEN SATURATION: 98 % | SYSTOLIC BLOOD PRESSURE: 120 MMHG | WEIGHT: 236.8 LBS | BODY MASS INDEX: 31.38 KG/M2

## 2025-04-22 DIAGNOSIS — F10.90 ALCOHOL USE DISORDER: ICD-10-CM

## 2025-04-22 DIAGNOSIS — I48.0 PAF (PAROXYSMAL ATRIAL FIBRILLATION) (MULTI): ICD-10-CM

## 2025-04-22 DIAGNOSIS — Z00.00 ROUTINE ADULT HEALTH MAINTENANCE: Primary | ICD-10-CM

## 2025-04-22 DIAGNOSIS — I48.91 ATRIAL FIBRILLATION WITH RVR (MULTI): Primary | ICD-10-CM

## 2025-04-22 LAB
ATRIAL RATE: 80 BPM
P AXIS: 40 DEGREES
P OFFSET: 174 MS
P ONSET: 121 MS
PR INTERVAL: 202 MS
Q ONSET: 222 MS
QRS COUNT: 13 BEATS
QRS DURATION: 114 MS
QT INTERVAL: 380 MS
QTC CALCULATION(BAZETT): 438 MS
QTC FREDERICIA: 418 MS
R AXIS: 27 DEGREES
T AXIS: 14 DEGREES
T OFFSET: 412 MS
VENTRICULAR RATE: 80 BPM

## 2025-04-22 PROCEDURE — 3008F BODY MASS INDEX DOCD: CPT | Performed by: INTERNAL MEDICINE

## 2025-04-22 PROCEDURE — 3008F BODY MASS INDEX DOCD: CPT | Performed by: FAMILY MEDICINE

## 2025-04-22 PROCEDURE — 99213 OFFICE O/P EST LOW 20 MIN: CPT | Performed by: INTERNAL MEDICINE

## 2025-04-22 PROCEDURE — 99203 OFFICE O/P NEW LOW 30 MIN: CPT | Performed by: INTERNAL MEDICINE

## 2025-04-22 PROCEDURE — 99396 PREV VISIT EST AGE 40-64: CPT | Performed by: FAMILY MEDICINE

## 2025-04-22 PROCEDURE — 93005 ELECTROCARDIOGRAM TRACING: CPT | Performed by: INTERNAL MEDICINE

## 2025-04-22 ASSESSMENT — PATIENT HEALTH QUESTIONNAIRE - PHQ9
2. FEELING DOWN, DEPRESSED OR HOPELESS: NOT AT ALL
1. LITTLE INTEREST OR PLEASURE IN DOING THINGS: NOT AT ALL
SUM OF ALL RESPONSES TO PHQ9 QUESTIONS 1 & 2: 0

## 2025-04-22 NOTE — PROGRESS NOTES
[facility] follow up:  Alfredo had his cardiology appt today and also will be seeing his PCP this afternoon.  No changes to his meds and if the Halter is OK, he'll be able to come off of the Eliquis.  Will follow up again in 2 weeks    Mckenzie Osuna RN [facility]  [phone]

## 2025-04-22 NOTE — PROGRESS NOTES
"  Subjective  Alfredo Stevens  is a 49 y.o. year old male who presents for paroxymal atrial tachycardia admitted [facility] 4/5/25 with paroxysmal atrial fibrillation related to mild alcohol use disorder..   No further palpitations   Quit drinking.  Sent in Holter yesterday    Blood pressure 120/80, pulse 80, height 1.854 m (6' 1\"), weight 107 kg (236 lb 12.8 oz), SpO2 98%.   Patient has no known allergies.  Medical History[1]  Surgical History[2]  Family History[3]  @SOC    Current Medications[4]     ROS  Review of Systems   All other systems reviewed and are negative.      Physical Exam  Physical Exam  Constitutional:       Appearance: Normal appearance.   HENT:      Head: Normocephalic and atraumatic.   Cardiovascular:      Rate and Rhythm: Normal rate and regular rhythm.   Pulmonary:      Effort: Pulmonary effort is normal.      Breath sounds: Normal breath sounds.   Abdominal:      General: Abdomen is flat.   Musculoskeletal:      Right lower leg: No edema.      Left lower leg: No edema.   Skin:     General: Skin is warm and dry.   Neurological:      General: No focal deficit present.      Mental Status: He is alert and oriented to person, place, and time.   Psychiatric:         Mood and Affect: Mood normal.         Behavior: Behavior normal.          EKG  Encounter Date: 04/22/25   ECG 12 Lead   Result Value    Ventricular Rate 80    Atrial Rate 80    MO Interval 202    QRS Duration 114    QT Interval 380    QTC Calculation(Bazett) 438    P Axis 40    R Axis 27    T Axis 14    QRS Count 13    Q Onset 222    P Onset 121    P Offset 174    T Offset 412    QTC Fredericia 418    Narrative    Normal sinus rhythm  Normal ECG  When compared with ECG of 04-APR-2025 15:35,  Sinus rhythm has replaced Atrial fibrillation  Vent. rate has decreased BY  91 BPM  Incomplete right bundle branch block is no longer Present  ST no longer depressed in Inferior leads  ST no longer depressed in Anterolateral leads       Problem List " Items Addressed This Visit       Atrial fibrillation with RVR (Multi) - Primary    4/11/25 echocardiogram LVEF = 60%, unremarkable valves, [id number] = 0          Relevant Orders    ECG 12 Lead (Completed)    Follow Up In Cardiology    Follow Up In Cardiology    Alcohol use disorder         Same meds  Await Holter results  Return 1 months with EKG      Evaristo Hung MD        [1] History reviewed. No pertinent past medical history.  [2]   Past Surgical History:  Procedure Laterality Date    COLONOSCOPY  2023    next colonoscopy due in 7 years    OTHER SURGICAL HISTORY  01/18/2022    Knee surgery   [3]   Family History  Problem Relation Name Age of Onset    No Known Problems Mother      No Known Problems Father     [4]   Current Outpatient Medications   Medication Sig Dispense Refill    apixaban (Eliquis) 5 mg tablet Take 1 tablet (5 mg) by mouth 2 times a day. 60 tablet 0    metoprolol succinate XL (Toprol-XL) 50 mg 24 hr tablet Take 1 tablet (50 mg) by mouth once daily. Do not crush or chew. 30 tablet 0     No current facility-administered medications for this visit.

## 2025-05-05 PROBLEM — J06.9 VIRAL UPPER RESPIRATORY TRACT INFECTION: Status: RESOLVED | Noted: 2024-10-30 | Resolved: 2025-05-05

## 2025-05-05 PROBLEM — Z20.822 EXPOSURE TO COVID-19 VIRUS: Status: RESOLVED | Noted: 2024-10-30 | Resolved: 2025-05-05

## 2025-05-05 PROBLEM — E66.811 CLASS 1 OBESITY WITH BODY MASS INDEX (BMI) OF 31.0 TO 31.9 IN ADULT: Status: ACTIVE | Noted: 2025-05-05

## 2025-05-05 PROBLEM — Z86.19 HISTORY OF VIRAL INFECTION: Status: RESOLVED | Noted: 2024-05-24 | Resolved: 2025-05-05

## 2025-05-07 ENCOUNTER — PATIENT OUTREACH (OUTPATIENT)
Dept: CARE COORDINATION | Facility: CLINIC | Age: 50
End: 2025-05-07
Payer: COMMERCIAL

## 2025-05-07 NOTE — PROGRESS NOTES
[facility] [place]:  Alfredo reports he is doing well, off all of the meds and is making the necessary lifestyle changes.  Will close the FR[facility]CO[facility] program    Mckenzie Osuna RN [facility] [place]  [phone]

## 2025-05-20 ENCOUNTER — OFFICE VISIT (OUTPATIENT)
Dept: CARDIOLOGY | Facility: CLINIC | Age: 50
End: 2025-05-20
Payer: COMMERCIAL

## 2025-05-20 VITALS
HEART RATE: 71 BPM | OXYGEN SATURATION: 98 % | BODY MASS INDEX: 30.61 KG/M2 | WEIGHT: 232 LBS | DIASTOLIC BLOOD PRESSURE: 82 MMHG | SYSTOLIC BLOOD PRESSURE: 124 MMHG

## 2025-05-20 DIAGNOSIS — I48.91 ATRIAL FIBRILLATION WITH RVR (MULTI): Primary | ICD-10-CM

## 2025-05-20 DIAGNOSIS — E66.811 CLASS 1 OBESITY WITHOUT SERIOUS COMORBIDITY WITH BODY MASS INDEX (BMI) OF 31.0 TO 31.9 IN ADULT, UNSPECIFIED OBESITY TYPE: ICD-10-CM

## 2025-05-20 DIAGNOSIS — F10.90 ALCOHOL USE DISORDER: ICD-10-CM

## 2025-05-20 LAB
ATRIAL RATE: 68 BPM
P AXIS: 53 DEGREES
P OFFSET: 161 MS
P ONSET: 103 MS
PR INTERVAL: 230 MS
Q ONSET: 218 MS
QRS COUNT: 11 BEATS
QRS DURATION: 116 MS
QT INTERVAL: 388 MS
QTC CALCULATION(BAZETT): 412 MS
QTC FREDERICIA: 404 MS
R AXIS: 35 DEGREES
T AXIS: 28 DEGREES
T OFFSET: 412 MS
VENTRICULAR RATE: 68 BPM

## 2025-05-20 PROCEDURE — 93010 ELECTROCARDIOGRAM REPORT: CPT | Performed by: INTERNAL MEDICINE

## 2025-05-20 PROCEDURE — 93005 ELECTROCARDIOGRAM TRACING: CPT | Performed by: INTERNAL MEDICINE

## 2025-05-20 PROCEDURE — 99212 OFFICE O/P EST SF 10 MIN: CPT | Mod: 25 | Performed by: INTERNAL MEDICINE

## 2025-05-20 PROCEDURE — 99214 OFFICE O/P EST MOD 30 MIN: CPT | Performed by: INTERNAL MEDICINE

## 2025-05-20 NOTE — ASSESSMENT & PLAN NOTE
49054 echocardiogram LVEF = 60%, unremarkable valves [id number] = 0; 4/7-21/25 Holter sinus between 44 and 164 with avg HR = 75, no aftib, no PSVT, no high grade AV block, no VT

## 2025-05-20 NOTE — PROGRESS NOTES
Subjective  Alfredo Stevens  is a 49 y.o. year old male who presents for 14 dya Holter follow up done for PAF related to mild alcohol use disorder.  No further palpiatation and BP has been goodHe has markedly decreased alcohol intake.    Blood pressure 124/82, pulse 71, weight 105 kg (232 lb), SpO2 98%.   Patient has no known allergies.  Medical History[1]  Surgical History[2]  Family History[3]  @SOC    Current Medications[4]     ROS  Review of Systems   All other systems reviewed and are negative.      Physical Exam  Physical Exam  Constitutional:       Appearance: He is obese.   HENT:      Head: Normocephalic and atraumatic.   Pulmonary:      Effort: Pulmonary effort is normal.      Breath sounds: Normal breath sounds.   Abdominal:      Palpations: Abdomen is soft.   Neurological:      Mental Status: He is alert.          EKG  Encounter Date: 04/22/25   ECG 12 Lead   Result Value    Ventricular Rate 80    Atrial Rate 80    SC Interval 202    QRS Duration 114    QT Interval 380    QTC Calculation(Bazett) 438    P Axis 40    R Axis 27    T Axis 14    QRS Count 13    Q Onset 222    P Onset 121    P Offset 174    T Offset 412    QTC Fredericia 418    Narrative    Normal sinus rhythm  Normal ECG  When compared with ECG of 04-APR-2025 15:35,  Sinus rhythm has replaced Atrial fibrillation  Vent. rate has decreased BY  91 BPM  Incomplete right bundle branch block is no longer Present  ST no longer depressed in Inferior leads  ST no longer depressed in Anterolateral leads  Confirmed by Evaristo Hung ([id number]) on 4/22/2025 4:38:49 PM       Problem List Items Addressed This Visit       Atrial fibrillation with RVR (Multi) - Primary    71800 echocardiogram LVEF = 60%, unremarkable valves [id number] = 0; 4/7-21/25 Holter sinus between 44 and 164 with avg HR = 75, no aftib, no PSVT, no high grade AV block, no VT         Relevant Orders    ECG 12 lead (Clinic Performed)    Alcohol use disorder    Class 1 obesity with body mass  Please return with new or worsening symptoms.  Get antibiotic prescription filled and take as directed for the complete course.  Follow-up with home health tomorrow as directed.   index (BMI) of 31.0 to 31.9 in adult         Cut  ou alcohol  Follow with Dr. Rashaad Hung MD        [1] History reviewed. No pertinent past medical history.  [2]   Past Surgical History:  Procedure Laterality Date    COLONOSCOPY  2023    next colonoscopy due in 7 years    OTHER SURGICAL HISTORY  01/18/2022    Knee surgery   [3]   Family History  Problem Relation Name Age of Onset    No Known Problems Mother      No Known Problems Father     [4]   Current Outpatient Medications   Medication Sig Dispense Refill    apixaban (Eliquis) 5 mg tablet Take 1 tablet (5 mg) by mouth 2 times a day. 60 tablet 0    metoprolol succinate XL (Toprol-XL) 50 mg 24 hr tablet Take 1 tablet (50 mg) by mouth once daily. Do not crush or chew. 30 tablet 0     No current facility-administered medications for this visit.

## 2025-05-21 LAB
CHOLEST SERPL-MCNC: 185 MG/DL
CHOLEST/HDLC SERPL: 4.7 (CALC)
HDLC SERPL-MCNC: 39 MG/DL
LDLC SERPL CALC-MCNC: 119 MG/DL (CALC)
NONHDLC SERPL-MCNC: 146 MG/DL (CALC)
TRIGL SERPL-MCNC: 157 MG/DL

## 2026-04-27 ENCOUNTER — APPOINTMENT (OUTPATIENT)
Dept: PRIMARY CARE | Facility: CLINIC | Age: 51
End: 2026-04-27
Payer: COMMERCIAL